# Patient Record
Sex: FEMALE | Race: WHITE | NOT HISPANIC OR LATINO | Employment: OTHER | URBAN - METROPOLITAN AREA
[De-identification: names, ages, dates, MRNs, and addresses within clinical notes are randomized per-mention and may not be internally consistent; named-entity substitution may affect disease eponyms.]

---

## 2019-05-16 ENCOUNTER — OFFICE VISIT (OUTPATIENT)
Dept: FAMILY MEDICINE | Facility: CLINIC | Age: 59
End: 2019-05-16
Payer: COMMERCIAL

## 2019-05-16 VITALS
DIASTOLIC BLOOD PRESSURE: 72 MMHG | OXYGEN SATURATION: 98 % | TEMPERATURE: 98 F | WEIGHT: 215 LBS | SYSTOLIC BLOOD PRESSURE: 138 MMHG | HEIGHT: 66 IN | HEART RATE: 98 BPM | BODY MASS INDEX: 34.55 KG/M2

## 2019-05-16 DIAGNOSIS — F90.2 ATTENTION DEFICIT HYPERACTIVITY DISORDER (ADHD), COMBINED TYPE: Primary | ICD-10-CM

## 2019-05-16 PROCEDURE — 99213 PR OFFICE/OUTPT VISIT, EST, LEVL III, 20-29 MIN: ICD-10-PCS | Mod: S$GLB,,, | Performed by: FAMILY MEDICINE

## 2019-05-16 PROCEDURE — 3008F PR BODY MASS INDEX (BMI) DOCUMENTED: ICD-10-PCS | Mod: CPTII,S$GLB,, | Performed by: FAMILY MEDICINE

## 2019-05-16 PROCEDURE — 3008F BODY MASS INDEX DOCD: CPT | Mod: CPTII,S$GLB,, | Performed by: FAMILY MEDICINE

## 2019-05-16 PROCEDURE — 99213 OFFICE O/P EST LOW 20 MIN: CPT | Mod: S$GLB,,, | Performed by: FAMILY MEDICINE

## 2019-05-16 RX ORDER — DEXTROAMPHETAMINE SACCHARATE, AMPHETAMINE ASPARTATE, DEXTROAMPHETAMINE SULFATE AND AMPHETAMINE SULFATE 5; 5; 5; 5 MG/1; MG/1; MG/1; MG/1
1 TABLET ORAL 2 TIMES DAILY
Qty: 60 TABLET | Refills: 0 | Status: SHIPPED | OUTPATIENT
Start: 2019-06-16 | End: 2019-09-09 | Stop reason: SDUPTHER

## 2019-05-16 RX ORDER — DEXTROAMPHETAMINE SACCHARATE, AMPHETAMINE ASPARTATE, DEXTROAMPHETAMINE SULFATE AND AMPHETAMINE SULFATE 2.5; 2.5; 2.5; 2.5 MG/1; MG/1; MG/1; MG/1
TABLET ORAL
COMMUNITY
End: 2019-05-16

## 2019-05-16 RX ORDER — DEXTROAMPHETAMINE SACCHARATE, AMPHETAMINE ASPARTATE, DEXTROAMPHETAMINE SULFATE AND AMPHETAMINE SULFATE 5; 5; 5; 5 MG/1; MG/1; MG/1; MG/1
1 TABLET ORAL 2 TIMES DAILY
Qty: 60 TABLET | Refills: 0 | Status: SHIPPED | OUTPATIENT
Start: 2019-07-16 | End: 2020-12-01

## 2019-05-16 RX ORDER — ACETAMINOPHEN 500 MG
1 TABLET ORAL
COMMUNITY
End: 2020-12-01

## 2019-05-16 RX ORDER — DEXTROAMPHETAMINE SACCHARATE, AMPHETAMINE ASPARTATE, DEXTROAMPHETAMINE SULFATE AND AMPHETAMINE SULFATE 5; 5; 5; 5 MG/1; MG/1; MG/1; MG/1
1 TABLET ORAL 2 TIMES DAILY
Qty: 60 TABLET | Refills: 0 | Status: SHIPPED | OUTPATIENT
Start: 2019-05-16 | End: 2019-09-09 | Stop reason: SDUPTHER

## 2019-05-16 NOTE — PROGRESS NOTES
Subjective:       Patient ID: Apolonia Soto is a 58 y.o. female.    Chief Complaint: Medication Refill (Pt stated that needed a refill for her Adderall)    57 (real date is different from chart, pt was born 1961) F here for f/u on ADHD.   Pt is on 20 mg Adderall BiD. Pt tolerates meds well, can function well and can multitask. No problems or side effects.   Pt has no other problems or concerns at this time    Review of Systems   Constitutional: Negative for activity change, chills, fatigue, fever and unexpected weight change.   HENT: Negative for ear pain, rhinorrhea and trouble swallowing.    Eyes: Negative for pain.   Respiratory: Negative for cough, chest tightness, shortness of breath and wheezing.    Cardiovascular: Negative for chest pain and palpitations.   Gastrointestinal: Negative for abdominal distention, abdominal pain, constipation, diarrhea, nausea and vomiting.   Endocrine: Negative for cold intolerance and heat intolerance.   Genitourinary: Negative for dysuria, frequency and urgency.   Musculoskeletal: Negative for myalgias.   Skin: Negative for rash.   Neurological: Negative for dizziness, syncope, light-headedness and headaches.   Hematological: Does not bruise/bleed easily.   Psychiatric/Behavioral: Negative for agitation and confusion.       Objective:      Physical Exam   Constitutional: She appears well-developed.   HENT:   Right Ear: External ear normal.   Left Ear: External ear normal.   Mouth/Throat: Oropharynx is clear and moist.   Eyes: Conjunctivae and EOM are normal.   Neck: Normal range of motion.   Cardiovascular: Normal rate, regular rhythm and intact distal pulses.   Pulmonary/Chest: Effort normal and breath sounds normal.   Abdominal: Soft.   Skin: Skin is warm. Capillary refill takes less than 2 seconds.   Psychiatric: She has a normal mood and affect.       Assessment:       1. Attention deficit hyperactivity disorder (ADHD), combined type        Plan:       PROBLEM LIST      Apolonia was seen today for medication refill.    Diagnoses and all orders for this visit:    Attention deficit hyperactivity disorder (ADHD), combined type  -     dextroamphetamine-amphetamine (ADDERALL) 20 mg tablet; Take 1 tablet by mouth 2 (two) times daily.  -     dextroamphetamine-amphetamine (ADDERALL) 20 mg tablet; Take 1 tablet by mouth 2 (two) times daily.  -     dextroamphetamine-amphetamine (ADDERALL) 20 mg tablet; Take 1 tablet by mouth 2 (two) times daily.

## 2019-09-09 ENCOUNTER — OFFICE VISIT (OUTPATIENT)
Dept: FAMILY MEDICINE | Facility: CLINIC | Age: 59
End: 2019-09-09
Payer: COMMERCIAL

## 2019-09-09 VITALS
BODY MASS INDEX: 32.3 KG/M2 | HEART RATE: 87 BPM | HEIGHT: 66 IN | SYSTOLIC BLOOD PRESSURE: 132 MMHG | RESPIRATION RATE: 16 BRPM | WEIGHT: 201 LBS | OXYGEN SATURATION: 96 % | DIASTOLIC BLOOD PRESSURE: 76 MMHG | TEMPERATURE: 97 F

## 2019-09-09 DIAGNOSIS — Z00.00 WELLNESS EXAMINATION: Primary | ICD-10-CM

## 2019-09-09 DIAGNOSIS — F90.2 ATTENTION DEFICIT HYPERACTIVITY DISORDER (ADHD), COMBINED TYPE: ICD-10-CM

## 2019-09-09 DIAGNOSIS — Z11.59 SCREENING FOR VIRAL DISEASE: ICD-10-CM

## 2019-09-09 DIAGNOSIS — R19.7 DIARRHEA, UNSPECIFIED TYPE: ICD-10-CM

## 2019-09-09 LAB
ABS NRBC COUNT: 0 X 10 3/UL (ref 0–0.01)
ABSOLUTE BASOPHIL: 0.04 X 10 3/UL (ref 0–0.22)
ABSOLUTE EOSINOPHIL: 0.06 X 10 3/UL (ref 0.04–0.54)
ABSOLUTE IMMATURE GRAN: 0.04 X 10 3/UL (ref 0–0.04)
ABSOLUTE LYMPHOCYTE: 1.99 X 10 3/UL (ref 0.86–4.75)
ABSOLUTE MONOCYTE: 1.01 X 10 3/UL (ref 0.22–1.08)
ALBUMIN SERPL-MCNC: 3.4 G/DL (ref 3.5–5.2)
ALBUMIN/GLOB SERPL ELPH: 1.1 {RATIO} (ref 1–2.7)
ALP ISOS SERPL LEV INH-CCNC: 71 IU/L (ref 35–105)
ALT (SGPT): 20 U/L (ref 0–33)
ANION GAP SERPL CALC-SCNC: 10 MMOL/L (ref 8–17)
AST SERPL-CCNC: 24 U/L (ref 0–32)
B12: 3416 PG/ML (ref 232–1245)
BASOPHILS NFR BLD: 0.4 %
BILIRUBIN, TOTAL: 0.49 MG/DL (ref 0–1.2)
BUN/CREAT SERPL: 7.5 (ref 6–20)
CALCIUM SERPL-MCNC: 9 MG/DL (ref 8.6–10.2)
CARBON DIOXIDE, CO2: 31 MMOL/L (ref 22–29)
CHLORIDE: 97 MMOL/L (ref 98–107)
CHOLEST SERPL-MSCNC: 153 MG/DL (ref 100–200)
CREAT SERPL-MCNC: 0.71 MG/DL (ref 0.5–0.9)
EOSINOPHIL NFR BLD: 0.5 %
ESTIMATED AVERAGE GLUCOSE: 118 MG/DL
GFR ESTIMATION: 84.55
GLOBULIN: 3 G/DL (ref 1.5–4.5)
GLUCOSE: 115 MG/DL (ref 74–106)
HBA1C MFR BLD: 5.8 % (ref 4–6)
HCT VFR BLD AUTO: 38.3 % (ref 37–47)
HCV IGG SERPL QL IA: NONREACTIVE
HDLC SERPL-MCNC: 37 MG/DL
HGB BLD-MCNC: 12.5 G/DL (ref 12–16)
IMMATURE GRANULOCYTES: 0.4 % (ref 0–0.5)
LDL/HDL RATIO: 2.5 (ref 1–3)
LDLC SERPL CALC-MCNC: 93.6 MG/DL (ref 0–100)
LYMPHOCYTES NFR BLD: 18.2 %
MCH RBC QN AUTO: 29.6 PG (ref 27–32)
MCHC RBC AUTO-ENTMCNC: 32.6 G/DL (ref 32–36)
MCV RBC AUTO: 90.5 FL (ref 82–100)
MONOCYTES NFR BLD: 9.2 %
NEUTROPHILS ABSOLUTE COUNT: 7.81 X 10 3/UL (ref 2.15–7.56)
NEUTROPHILS NFR BLD: 71.3 %
NUCLEATED RED BLOOD CELLS: 0 /100 WBC (ref 0–0.2)
PLATELET # BLD AUTO: 477 X 10 3/UL (ref 135–400)
POTASSIUM: 4 MMOL/L (ref 3.5–5.1)
PROT SNV-MCNC: 6.4 G/DL (ref 6.4–8.3)
RBC # BLD AUTO: 4.23 X 10 6/UL (ref 4.2–5.4)
RDW-SD: 45.9 FL (ref 37–54)
SODIUM: 138 MMOL/L (ref 136–145)
TRIGL SERPL-MCNC: 112 MG/DL (ref 0–150)
TSH SERPL DL<=0.005 MIU/L-ACNC: 2.36 UIU/ML (ref 0.27–4.2)
UREA NITROGEN (BUN): 5.3 MG/DL (ref 6–20)
VITAMIN D (25OHD): 44.6 NG/ML
WBC # BLD: 10.95 X 10 3/UL (ref 4.3–10.8)

## 2019-09-09 PROCEDURE — 3008F PR BODY MASS INDEX (BMI) DOCUMENTED: ICD-10-PCS | Mod: CPTII,S$GLB,, | Performed by: FAMILY MEDICINE

## 2019-09-09 PROCEDURE — 3008F BODY MASS INDEX DOCD: CPT | Mod: CPTII,S$GLB,, | Performed by: FAMILY MEDICINE

## 2019-09-09 PROCEDURE — 99213 OFFICE O/P EST LOW 20 MIN: CPT | Mod: S$GLB,,, | Performed by: FAMILY MEDICINE

## 2019-09-09 PROCEDURE — 99213 PR OFFICE/OUTPT VISIT, EST, LEVL III, 20-29 MIN: ICD-10-PCS | Mod: S$GLB,,, | Performed by: FAMILY MEDICINE

## 2019-09-09 RX ORDER — DEXTROAMPHETAMINE SACCHARATE, AMPHETAMINE ASPARTATE, DEXTROAMPHETAMINE SULFATE AND AMPHETAMINE SULFATE 5; 5; 5; 5 MG/1; MG/1; MG/1; MG/1
1 TABLET ORAL 2 TIMES DAILY
Qty: 60 TABLET | Refills: 0 | Status: SHIPPED | OUTPATIENT
Start: 2019-09-09 | End: 2020-12-01 | Stop reason: SDUPTHER

## 2019-09-09 RX ORDER — DEXTROAMPHETAMINE SACCHARATE, AMPHETAMINE ASPARTATE, DEXTROAMPHETAMINE SULFATE AND AMPHETAMINE SULFATE 5; 5; 5; 5 MG/1; MG/1; MG/1; MG/1
1 TABLET ORAL 2 TIMES DAILY
Qty: 60 TABLET | Refills: 0 | Status: SHIPPED | OUTPATIENT
Start: 2019-10-09 | End: 2019-12-16 | Stop reason: SDUPTHER

## 2019-09-09 RX ORDER — METRONIDAZOLE 250 MG/1
500 TABLET ORAL EVERY 12 HOURS
Qty: 14 TABLET | Refills: 0 | Status: SHIPPED | OUTPATIENT
Start: 2019-09-09 | End: 2019-09-12 | Stop reason: SDUPTHER

## 2019-09-09 RX ORDER — DEXTROAMPHETAMINE SACCHARATE, AMPHETAMINE ASPARTATE, DEXTROAMPHETAMINE SULFATE AND AMPHETAMINE SULFATE 5; 5; 5; 5 MG/1; MG/1; MG/1; MG/1
1 TABLET ORAL 2 TIMES DAILY
Qty: 60 TABLET | Refills: 0 | Status: SHIPPED | OUTPATIENT
Start: 2019-11-09 | End: 2019-12-16 | Stop reason: SDUPTHER

## 2019-09-09 NOTE — PROGRESS NOTES
Subjective:       Patient ID: Apolonia Soto is a 59 y.o. female.    Chief Complaint: No chief complaint on file.    57 yo F here for her annual wellness exam and for f/u on diarrhea x 12 days and for ADHD.   Wellness: pt will get pap smear next month, she had a mammogram last year, pt will think of getting a colonoscopy done in the near future.   Pt has had off/on abdominal pain and very smelly diarrhea x 12 days. She also had blood mixed in for one day. pt had fever for a couple of days for which she took tylenol. Zantac actually helped with the cramping. Pt gets heart burn sensation with anything she eats or drinks. Prior to the diarrhea she had a 10 day run of clindamycin antibiotics for a pending tooth procedure. She finished that regiment about 1 day prior to getting diarrhea. Cramping is mid abdominally. Cramping is just before she needs to use the restroom. Diarrhea is having mucus as well as brown color. It has a very potent smell. Pt does not remember eating potato salad, chicken salad or a warmed rice dish prior to it. There are also no sick contacts around.   Pt also has ADHD which is controlled on adderall 20 mg BiD. Pt needs ADHD medication to function well, finish work on time and to multi task.  Pt has not trouble sleeping and eating. pt denies diaphoresis, palpitations and diarrhea. pt understands the nature of the controlled substances. Pt won't share or otherwise make available the medication.     Review of Systems   Constitutional: Positive for chills and fever. Negative for activity change, fatigue and unexpected weight change.   HENT: Negative for ear pain, rhinorrhea and trouble swallowing.    Eyes: Negative for pain.   Respiratory: Negative for cough, chest tightness, shortness of breath and wheezing.    Cardiovascular: Negative for chest pain and palpitations.   Gastrointestinal: Negative for abdominal distention, abdominal pain, constipation, diarrhea, nausea and vomiting.   Endocrine:  Negative for cold intolerance and heat intolerance.   Genitourinary: Negative for dysuria, frequency and urgency.   Musculoskeletal: Positive for arthralgias. Negative for myalgias.   Skin: Negative for rash.   Neurological: Negative for dizziness, syncope, light-headedness and headaches.   Hematological: Does not bruise/bleed easily.   Psychiatric/Behavioral: Negative for agitation and confusion.       Objective:      Physical Exam   Constitutional: She appears well-developed.   HENT:   Right Ear: External ear normal.   Left Ear: External ear normal.   Mouth/Throat: Oropharynx is clear and moist.   Eyes: Conjunctivae and EOM are normal.   Neck: Normal range of motion.   Cardiovascular: Normal rate, regular rhythm and intact distal pulses.   Pulmonary/Chest: Effort normal and breath sounds normal.   Abdominal: Soft.   Skin: Skin is warm. Capillary refill takes less than 2 seconds.   Psychiatric: She has a normal mood and affect.       Assessment:       1. Wellness examination    2. Diarrhea, unspecified type    3. Attention deficit hyperactivity disorder (ADHD), combined type    4. Screening for viral disease        Plan:       PROBLEM LIST     Diagnoses and all orders for this visit:    Wellness examination  -     CBC auto differential; Future  -     Comprehensive metabolic panel; Future  -     Lipid panel; Future  -     Vitamin D; Future  -     Hemoglobin A1c; Future  -     TSH; Future  -     Vitamin B12; Future  -     Hepatitis C antibody; Future  -     CBC auto differential  -     Comprehensive metabolic panel  -     Lipid panel  -     Vitamin D  -     Hemoglobin A1c  -     TSH  -     Vitamin B12  -     Hepatitis C antibody    Diarrhea, unspecified type  -     Stool culture; Future  -     metroNIDAZOLE (FLAGYL) 250 MG tablet; Take 2 tablets (500 mg total) by mouth every 12 (twelve) hours.  -     Stool culture  -     Clostridium difficile EIA; Future  -     Clostridium difficile EIA    Attention deficit  hyperactivity disorder (ADHD), combined type  -     dextroamphetamine-amphetamine (ADDERALL) 20 mg tablet; Take 1 tablet by mouth 2 (two) times daily.  -     dextroamphetamine-amphetamine (ADDERALL) 20 mg tablet; Take 1 tablet by mouth 2 (two) times daily.  -     dextroamphetamine-amphetamine (ADDERALL) 20 mg tablet; Take 1 tablet by mouth 2 (two) times daily.    Screening for viral disease  -     Hepatitis C antibody; Future  -     Hepatitis C antibody

## 2019-09-10 LAB
APPEARANCE SNV: ABNORMAL
C. DIFFICILE TOXIN: DETECTED
CAMPYLOBACTER SPP DNA: NOT DETECTED
ETEC DNA: NOT DETECTED
P. SHIGELLOIDES DNA: NOT DETECTED
SALMONELLA SPP DNA: NOT DETECTED
SHIGA TOXIN GENE: NOT DETECTED
SHIGELLA SPP/EIEC DNA: NOT DETECTED
VIBRIO SPP DNA: NOT DETECTED
Y. ENTEROCOLITICA DNA: NOT DETECTED

## 2019-09-12 DIAGNOSIS — R19.7 DIARRHEA, UNSPECIFIED TYPE: ICD-10-CM

## 2019-09-12 RX ORDER — METRONIDAZOLE 250 MG/1
500 TABLET ORAL EVERY 12 HOURS
Qty: 14 TABLET | Refills: 0 | Status: SHIPPED | OUTPATIENT
Start: 2019-09-12 | End: 2019-09-23 | Stop reason: SDUPTHER

## 2019-09-23 ENCOUNTER — OFFICE VISIT (OUTPATIENT)
Dept: FAMILY MEDICINE | Facility: CLINIC | Age: 59
End: 2019-09-23
Payer: COMMERCIAL

## 2019-09-23 VITALS
HEART RATE: 74 BPM | SYSTOLIC BLOOD PRESSURE: 140 MMHG | OXYGEN SATURATION: 97 % | HEIGHT: 66 IN | BODY MASS INDEX: 31.5 KG/M2 | TEMPERATURE: 97 F | WEIGHT: 196 LBS | DIASTOLIC BLOOD PRESSURE: 78 MMHG | RESPIRATION RATE: 16 BRPM

## 2019-09-23 DIAGNOSIS — R19.7 DIARRHEA, UNSPECIFIED TYPE: ICD-10-CM

## 2019-09-23 DIAGNOSIS — A04.72 CLOSTRIDIUM DIFFICILE DIARRHEA: ICD-10-CM

## 2019-09-23 DIAGNOSIS — A09 DIARRHEA OF INFECTIOUS ORIGIN: Primary | ICD-10-CM

## 2019-09-23 DIAGNOSIS — R11.0 NAUSEA: ICD-10-CM

## 2019-09-23 DIAGNOSIS — A04.72 C. DIFFICILE DIARRHEA: Primary | ICD-10-CM

## 2019-09-23 PROCEDURE — 3008F PR BODY MASS INDEX (BMI) DOCUMENTED: ICD-10-PCS | Mod: CPTII,S$GLB,, | Performed by: FAMILY MEDICINE

## 2019-09-23 PROCEDURE — 99213 OFFICE O/P EST LOW 20 MIN: CPT | Mod: S$GLB,,, | Performed by: FAMILY MEDICINE

## 2019-09-23 PROCEDURE — 3008F BODY MASS INDEX DOCD: CPT | Mod: CPTII,S$GLB,, | Performed by: FAMILY MEDICINE

## 2019-09-23 PROCEDURE — 99213 PR OFFICE/OUTPT VISIT, EST, LEVL III, 20-29 MIN: ICD-10-PCS | Mod: S$GLB,,, | Performed by: FAMILY MEDICINE

## 2019-09-23 RX ORDER — ONDANSETRON 8 MG/1
8 TABLET, ORALLY DISINTEGRATING ORAL EVERY 12 HOURS PRN
Qty: 30 TABLET | Refills: 0 | Status: SHIPPED | OUTPATIENT
Start: 2019-09-23 | End: 2019-12-16

## 2019-09-23 RX ORDER — METRONIDAZOLE 250 MG/1
250 TABLET ORAL EVERY 12 HOURS
Qty: 20 TABLET | Refills: 0 | Status: SHIPPED | OUTPATIENT
Start: 2019-09-23 | End: 2019-09-24

## 2019-09-23 NOTE — PROGRESS NOTES
Subjective:       Patient ID: Apolonia Soto is a 59 y.o. female.    Chief Complaint: No chief complaint on file.    59 yo F here for repeated diarrhea which was dx'd 2 weeks ago as C-diff by stool testing. Pt was put on 1 week of metronidazole after which pt felt better briefly. Now the diarrhea has returned, as well as all the other symptoms such as swelling, cramping, mucous stool, tenesmus etc. Pt also had home tested fever at 102 two days ago.   Discussed that as per guidelines we need to treat one more bout with metronidazole. This time for 10 days.   Pt feels weak and listless.     Review of Systems   Constitutional: Positive for fatigue and fever. Negative for activity change, chills and unexpected weight change.   HENT: Negative for ear pain, rhinorrhea and trouble swallowing.    Eyes: Negative for pain.   Respiratory: Negative for cough, chest tightness, shortness of breath and wheezing.    Cardiovascular: Negative for chest pain and palpitations.   Gastrointestinal: Positive for abdominal pain, diarrhea and nausea. Negative for abdominal distention, constipation and vomiting.   Endocrine: Negative for cold intolerance and heat intolerance.   Genitourinary: Negative for dysuria, frequency and urgency.   Musculoskeletal: Negative for myalgias.   Skin: Negative for rash.   Neurological: Negative for dizziness, syncope, light-headedness and headaches.   Hematological: Does not bruise/bleed easily.   Psychiatric/Behavioral: Negative for agitation and confusion.       Objective:      Physical Exam   Constitutional: She appears well-developed.   HENT:   Right Ear: External ear normal.   Left Ear: External ear normal.   Mouth/Throat: Oropharynx is clear and moist.   Eyes: Conjunctivae and EOM are normal.   Neck: Normal range of motion.   Cardiovascular: Normal rate, regular rhythm and intact distal pulses.   Pulmonary/Chest: Effort normal and breath sounds normal.   Abdominal: Soft.   Skin: Skin is warm.  Capillary refill takes less than 2 seconds.   Psychiatric: She has a normal mood and affect.       Assessment:       1. Diarrhea of infectious origin    2. Clostridium difficile diarrhea        Plan:       PROBLEM LIST     Diagnoses and all orders for this visit:    Diarrhea of infectious origin    Clostridium difficile diarrhea    we have called out metronidazole and a stool culture for Cdiff

## 2019-09-24 DIAGNOSIS — A04.72 CLOSTRIDIUM DIFFICILE DIARRHEA: Primary | ICD-10-CM

## 2019-09-24 LAB
APPEARANCE SNV: ABNORMAL
C. DIFFICILE TOXIN: DETECTED

## 2019-09-24 RX ORDER — METRONIDAZOLE 500 MG/1
500 TABLET ORAL EVERY 8 HOURS
Qty: 24 TABLET | Refills: 0 | Status: SHIPPED | OUTPATIENT
Start: 2019-09-24 | End: 2019-10-02

## 2019-10-18 ENCOUNTER — OFFICE VISIT (OUTPATIENT)
Dept: FAMILY MEDICINE | Facility: CLINIC | Age: 59
End: 2019-10-18
Payer: COMMERCIAL

## 2019-10-18 VITALS
OXYGEN SATURATION: 96 % | HEART RATE: 85 BPM | BODY MASS INDEX: 31.82 KG/M2 | TEMPERATURE: 97 F | RESPIRATION RATE: 14 BRPM | WEIGHT: 198 LBS | HEIGHT: 66 IN

## 2019-10-18 VITALS
DIASTOLIC BLOOD PRESSURE: 78 MMHG | WEIGHT: 196 LBS | BODY MASS INDEX: 31.5 KG/M2 | HEART RATE: 75 BPM | HEIGHT: 66 IN | OXYGEN SATURATION: 96 % | SYSTOLIC BLOOD PRESSURE: 120 MMHG | RESPIRATION RATE: 16 BRPM

## 2019-10-18 DIAGNOSIS — A04.72 CLOSTRIDIUM DIFFICILE DIARRHEA: Primary | ICD-10-CM

## 2019-10-18 DIAGNOSIS — B96.20 E-COLI UTI: ICD-10-CM

## 2019-10-18 DIAGNOSIS — N30.90 KLEBSIELLA CYSTITIS: ICD-10-CM

## 2019-10-18 DIAGNOSIS — Z71.2 ENCOUNTER TO DISCUSS TEST RESULTS: ICD-10-CM

## 2019-10-18 DIAGNOSIS — N39.0 E-COLI UTI: ICD-10-CM

## 2019-10-18 DIAGNOSIS — Z09 HOSPITAL DISCHARGE FOLLOW-UP: ICD-10-CM

## 2019-10-18 DIAGNOSIS — B96.1 KLEBSIELLA CYSTITIS: ICD-10-CM

## 2019-10-18 DIAGNOSIS — R74.01 TRANSAMINITIS: ICD-10-CM

## 2019-10-18 DIAGNOSIS — D50.0 ANEMIA DUE TO BLOOD LOSS: ICD-10-CM

## 2019-10-18 DIAGNOSIS — A04.72 CLOSTRIDIOIDES DIFFICILE DIARRHEA: Primary | ICD-10-CM

## 2019-10-18 PROCEDURE — 99213 OFFICE O/P EST LOW 20 MIN: CPT | Mod: S$GLB,,, | Performed by: FAMILY MEDICINE

## 2019-10-18 PROCEDURE — 96372 PR INJECTION,THERAP/PROPH/DIAG2ST, IM OR SUBCUT: ICD-10-PCS | Mod: S$GLB,,, | Performed by: NURSE PRACTITIONER

## 2019-10-18 PROCEDURE — 3008F PR BODY MASS INDEX (BMI) DOCUMENTED: ICD-10-PCS | Mod: CPTII,S$GLB,, | Performed by: FAMILY MEDICINE

## 2019-10-18 PROCEDURE — 96372 THER/PROPH/DIAG INJ SC/IM: CPT | Mod: S$GLB,,, | Performed by: NURSE PRACTITIONER

## 2019-10-18 PROCEDURE — 99215 OFFICE O/P EST HI 40 MIN: CPT | Mod: 25,S$GLB,, | Performed by: NURSE PRACTITIONER

## 2019-10-18 PROCEDURE — 3008F PR BODY MASS INDEX (BMI) DOCUMENTED: ICD-10-PCS | Mod: CPTII,S$GLB,, | Performed by: NURSE PRACTITIONER

## 2019-10-18 PROCEDURE — 99213 PR OFFICE/OUTPT VISIT, EST, LEVL III, 20-29 MIN: ICD-10-PCS | Mod: S$GLB,,, | Performed by: FAMILY MEDICINE

## 2019-10-18 PROCEDURE — 3008F BODY MASS INDEX DOCD: CPT | Mod: CPTII,S$GLB,, | Performed by: NURSE PRACTITIONER

## 2019-10-18 PROCEDURE — 99215 PR OFFICE/OUTPT VISIT, EST, LEVL V, 40-54 MIN: ICD-10-PCS | Mod: 25,S$GLB,, | Performed by: NURSE PRACTITIONER

## 2019-10-18 PROCEDURE — 3008F BODY MASS INDEX DOCD: CPT | Mod: CPTII,S$GLB,, | Performed by: FAMILY MEDICINE

## 2019-10-18 RX ORDER — CEFTRIAXONE 1 G/1
1 INJECTION, POWDER, FOR SOLUTION INTRAMUSCULAR; INTRAVENOUS
Status: COMPLETED | OUTPATIENT
Start: 2019-10-18 | End: 2019-10-18

## 2019-10-18 RX ORDER — VANCOMYCIN HYDROCHLORIDE 250 MG/1
250 CAPSULE ORAL EVERY 6 HOURS
Qty: 12 CAPSULE | Refills: 0 | Status: SHIPPED | OUTPATIENT
Start: 2019-10-18 | End: 2019-10-21

## 2019-10-18 RX ADMIN — CEFTRIAXONE 1 G: 1 INJECTION, POWDER, FOR SOLUTION INTRAMUSCULAR; INTRAVENOUS at 12:10

## 2019-10-18 NOTE — ASSESSMENT & PLAN NOTE
Case discussed w/ Dr. Lyle. See below:    Patient will continue vancomycin 250 mg p.o. Q i.d. times 14 days after today 2/t the IM Rocephin injection she received for her gram negative UTI (see Cultures).  She was given handwritten RX to take to pharmacy for 3 additional days given she has 12 days of medication left. She will follow up with us in 14 days.  Next option will be fecal transplant, which will be done in [place]. Pt and  voiced understanding.  Dr. Lyle will handle this if needed. She was instructed to get OTC probiotic in addition to her activia.     She has Zofran, hydrocodone, and Imodium that was prescribed by [facility].  She was instructed to avoid all NSAIDs.     She will get baseline labs today @ [facility]. Will call her with results on Monday.     Information provided to the patient and .       Wash your hands with soap and water every time you use the bathroom and always before you eat. Remind relatives and friends taking care of you to do the same.  Try to use a separate bathroom if you have diarrhea. If you cant, be sure the bathroom is well cleaned before others use it.  Take showers and wash with soap to remove any C. diff germs you could be carrying on your body.    Mix 1 part bleach to 10 parts water (for example, 1/4 cup bleach poured into 2 1/2 cups water).  Surfaces  When youre cleaning, focus on items that are touched by hands:  · doorknobs  · electronics (be careful because bleach can damage many electronics and plastics)  · refrigerator handles  · shared cups  · toilet flushers and toilet seats    Laundry  If someone in your house has C. diff, wash items they touch before others use them. These include but are not limited to:  · bed linens  · towels  · household linens  · clothing, especially underwear  If these things have visible poop, rinse them well before washing.  Then launder in a washer and dryer, using the hottest water that is safe for those items. Use  chlorine bleach if the items can be safely washed with it.  Wash your hands with soap and water after you handle the dirty laundry.  Its OK to take clothes to a  that were worn by a patient infected with C. diff. However, dry cleaning isnt as effective as other methods at killing the spores. So this option should be used only for clothes that cant be machine-washed.

## 2019-10-18 NOTE — ASSESSMENT & PLAN NOTE
Very minimal findings on CBC.  No concern at this point given this is an expected finding.  Reassurance provided.

## 2019-10-18 NOTE — ASSESSMENT & PLAN NOTE
10-25 K colonies.     Sensitive to rocephin.  Will give her 1 G IM here. Repeat UA in 7 days to confirm resolution.

## 2019-10-18 NOTE — PROGRESS NOTES
Subjective:      Patient ID: Apolonia Soto is a 59 y.o. female.    Chief Complaint: cdiff      59-year-old female here today for initial consultation regarding reoccurent C difficile.  Referral by Dr. Deal.  The patient is accompanied by her  today.  Her primary care provider is Dr. Belen Starkey.  No prior medical history other than ADHD.     The patient states that she was treated prophylactically by her dentist with clindamycin back in August.  August 23rd was her last day of clindamycin. She was given 10 days of clindamycin prior to a dental procedure.  During the 10th day, she noticed fever, chills, headache and subsequently diarrhea.  She was seen by her primary care provider on September 9th and prescribed Flagyl 250 mg (2 capsules) b.i.d. times 7 days.  She relapsed on September 23rd and then was prescribed Flagyl 500 mg tid x 8 days on Sept 26th.  She had 3 days of remission between October 7th and October 10th.  On October 10th her symptoms returned. The patient became weak and was subsequently brought to [facility]"" on October 11th.  She was retested for C. Difficile at the time and was positive.  She was then given vancomycin 125 mg every 6 hr for 10 days and released.  She returned to [facility] on October 13th and was kept for dehydration and altered mental status.  She was released October 16th with a prescription of vancomycin 250 mg q.i.d. times 14 days.  She was referred to Dr. Reyna for anemia and inflammation of the colon.  Apparently he noted a UTI in the paperwork from [facility] and therefore referred the patient here for treatment of UTI and C. Diff.  The patient is currently on her 2nd day of vancomycin 250 mg.  She is eating Activia yogurt daily.  She is not on oral probiotics.  She is not on PPIs currently.       The patient states that Dr. Chandler noted a UTI in her paperwork from [facility].  She was told to address this at this visit.  She reports lower back pain and dysuria.   Denies fever, chills, flank pain. Denies suprapubic pain. Denies weakness, lethargy, AMS.       ROS:   Review of Systems   Constitutional: Negative.    Respiratory: Negative.    Cardiovascular: Negative.    Gastrointestinal: Positive for abdominal pain (cramping) and diarrhea. Negative for abdominal distention, anal bleeding, blood in stool, constipation, nausea, rectal pain and vomiting.   Endocrine: Negative.    Musculoskeletal: Positive for back pain (bilateral lumbar).   Neurological: Negative.    Hematological: Negative.      Objective:   Physical Exam   Constitutional: Vital signs are normal. She appears well-developed and well-nourished. She is active.  Non-toxic appearance. She does not have a sickly appearance. She does not appear ill. No distress.   HENT:   Right Ear: External ear normal.   Left Ear: External ear normal.   Mouth/Throat: Oropharynx is clear and moist.   Eyes: Conjunctivae and EOM are normal.   Neck: Normal range of motion.   Cardiovascular: Normal rate and regular rhythm.   Pulmonary/Chest: Effort normal and breath sounds normal.   Abdominal: Soft. Bowel sounds are increased. There is no CVA tenderness.   Musculoskeletal: She exhibits no edema.   Neurological: She is alert.   Skin: Skin is warm and dry. Capillary refill takes less than 2 seconds. No pallor.   Psychiatric: She has a normal mood and affect. Her speech is normal and behavior is normal. Judgment and thought content normal. Cognition and memory are normal.   Nursing note and vitals reviewed.    Assessment:     1. Clostridium difficile diarrhea    2. Klebsiella cystitis    3. E-coli UTI    4. Anemia due to blood loss      Plan:     Problem List Items Addressed This Visit        Renal/    Klebsiella cystitis    Current Assessment & Plan     10-25 K colonies.     Sensitive to rocephin.  Will give her 1 G IM here. Repeat UA in 7 days to confirm resolution.          Relevant Medications    cefTRIAXone injection 1 g (Completed)     Other Relevant Orders    Urinalysis, Reflex to Urine Culture Urine, Clean Catch    E-coli UTI    Current Assessment & Plan     10-25 K colonies.     Sensitive to rocephin.  Will give her 1 G IM here. Repeat UA in 7 days to confirm resolution.          Relevant Medications    cefTRIAXone injection 1 g (Completed)    Other Relevant Orders    Urinalysis, Reflex to Urine Culture Urine, Clean Catch       ID    Clostridium difficile diarrhea - Primary    Overview     3rd occurrence. PT was given clindamycin x 10 days for a dental procedure. This was initiating event.     Has tried two rounds of flagyl.    Finished a round of Vanc 125mg qid x 14 days.     Currently on Vanc 250mg qid x 14 days from [facility]         Current Assessment & Plan     Case discussed w/ Dr. Lyle. See below:    Patient will continue vancomycin 250 mg p.o. Q i.d. times 14 days after today 2/t the IM Rocephin injection she received for her gram negative UTI (see Cultures).  She was given handwritten RX to take to pharmacy for 3 additional days given she has 12 days of medication left. She will follow up with us in 14 days.  Next option will be fecal transplant, which will be done in [place]. Pt and  voiced understanding.  Dr. Lyle will handle this if needed. She was instructed to get OTC probiotic in addition to her activia.     She has Zofran, hydrocodone, and Imodium that was prescribed by [facility].  She was instructed to avoid all NSAIDs.     She will get baseline labs today @ [facility]. Will call her with results on Monday.     Information provided to the patient and .       Wash your hands with soap and water every time you use the bathroom and always before you eat. Remind relatives and friends taking care of you to do the same.  Try to use a separate bathroom if you have diarrhea. If you cant, be sure the bathroom is well cleaned before others use it.  Take showers and wash with soap to remove any C. diff germs you could  be carrying on your body.    Mix 1 part bleach to 10 parts water (for example, 1/4 cup bleach poured into 2 1/2 cups water).  Surfaces  When youre cleaning, focus on items that are touched by hands:  · doorknobs  · electronics (be careful because bleach can damage many electronics and plastics)  · refrigerator handles  · shared cups  · toilet flushers and toilet seats    Laundry  If someone in your house has C. diff, wash items they touch before others use them. These include but are not limited to:  · bed linens  · towels  · household linens  · clothing, especially underwear  If these things have visible poop, rinse them well before washing.  Then launder in a washer and dryer, using the hottest water that is safe for those items. Use chlorine bleach if the items can be safely washed with it.  Wash your hands with soap and water after you handle the dirty laundry.  Its OK to take clothes to a  that were worn by a patient infected with C. diff. However, dry cleaning isnt as effective as other methods at killing the spores. So this option should be used only for clothes that cant be machine-washed.               Relevant Orders    Comprehensive metabolic panel    CBC auto differential    Magnesium    Sedimentation rate    C-reactive protein       Oncology    Anemia due to blood loss    Current Assessment & Plan     Very minimal findings on CBC.  No concern at this point given this is an expected finding.  Reassurance provided.             Approximately 45 min was spent with the patient and  in the room.  Approximately 30 min spent reviewing medical records in the room.  Dr. Lyle was consulted for treatment and plan of care.     CT abdomen pelvis scan reviewed which showed colitis.  There was no evidence of perinephric stranding or obstructing nephrolithiasis.     All questions were answered to the patient and 's liking.  They voiced understanding that they will get labs done today and  that they would receive a call on Monday regarding all labs if available.  She will test her urine for cure in 7 days.

## 2019-10-18 NOTE — PATIENT INSTRUCTIONS
Clostridium Difficile Infection  Clostridium difficile (C. diff) bacteria can be very harmful. They affect the intestinal tract. They can cause symptoms ranging from mild diarrhea to severe inflammation of the large intestine (colon). C. diff infection is most common during the days and weeks after treatment with antibiotics. Anyone can become infected. But the risk is greatly increased for people in hospitals and for people living in nursing homes or long-term care facilities. This is because antibiotic use is common there. Germs also spread easily in these places.    What causes C. diff infection?  The stomach and intestines have hundreds of kinds of bacteria. Many of these bacteria actually help keep harmful bacteria like C. diff from causing problems. Small amounts of C. diff are normal in the intestine and dont cause problems. When you take an antibiotic, the normal balance of good and bad bacteria may be affected. There may be too few good bacteria and too many harmful bacteria like C diff. In hospitals and nursing homes, C. diff may be spread from an infected person to others. This can happen when staff or visitors touch infected people or objects such as bed rails, stethoscopes, or bedpans and then touch other people or surfaces.  What are the symptoms of C. diff infection?  About half of people with C. diff infection have no symptoms. Yet they can still pass the infection to others. Others do have symptoms. These include:  · Watery diarrhea, which may contain mucus  · Pain and cramping  · Fever  Some who are infected develop serious problems. Symptoms include:  · Belly (abdominal) pain  · Abdominal swelling  · Nausea and vomiting  · Little or no diarrhea  How is C. diff infection diagnosed?  To confirm the infection, a sample of stool is tested for the bacteria or the toxins made by the bacteria.  How is C. diff infection treated?  Your healthcare provider will tell you to stop taking any antibiotics you  have been prescribed, based on your healthcare needs. He or she may prescribe different medicines as needed. In certain cases, you may be given an antibiotic directed at the C. diff infection. Talk with your healthcare provider before stopping or starting any medicines.  · Fluids are often given by IV (intravenously) through a vein. This helps replace fluids lost through diarrhea.  · In rare cases you may need surgery if treatment doesnt cure severe symptoms  To lessen symptoms:  · Drink plenty of fluids to replace water lost through diarrhea. Talk with your healthcare provider or nurse about which fluids are best.  · Follow your healthcare providers instructions for when and what to eat.  · Unless your healthcare provider tells you to do so, don't take medicines for diarrhea.  · Tell your healthcare provider if symptoms return. Even after treatment, C. diff may come back.  Your doctor may give you an additional medicine if your symptoms come back or you are at risk for another C. diff infection. This medicine is called bezlotoxumab. It is not an antibiotic, but it can help keep your C. diff symptoms from returning.  What are the complications of C. diff infection?  Complications include:  · Dehydration  · Electrolyte imbalances  · Low protein in the blood  · Severe widening (dilation) of the large intestine  · A hole (perforation) in the bowel  · Low blood pressure  · Kidney failure  · Inflammation or infection all over the body  · Death  How is C. diff prevented?  Hospitals and nursing homes take these steps to help prevent C. diff infections:  · Limiting use of antibiotics. Giving antibiotics only when needed can help reduce C. diff infections.  · Handwashing. Hospital staff should wash their hands before and after treating each person. They should also wash their hands after touching any surface in someone's' room. Soap and water work better than alcohol-based hand .  · Protective clothing. Healthcare  workers should wear gloves and a gown when entering the room of someone with C. diff infection. They should remove these items before leaving and then wash their hands.  · Private rooms. People with C. diff should be in private rooms. Or they may share rooms with others who have the same infection.  · Thorough cleaning. Equipment and rooms should be cleaned and disinfected every day.  · Education. Everyone should be shown the best ways to avoid infection.  You can do the following to help prevent C. diff:  · Take antibiotics only when you really need them. Antibiotics dont help treat illnesses caused by viruses. This includes colds and the flu. Dont ask for antibiotics from your healthcare provider if he or she says they wont work.  · When you are given antibiotics, take them as directed. Dont take more or less than the dosage prescribed. Do not take them for shorter or longer than your provider tells you to, even if you feel better.  · Wash your hands carefully. Do this after using the bathroom and before eating. Use plenty of soap and warm water. Alcohol-based hand  may not work against C. diff germs.  Everyone can help prevent C. diff:  In a hospital or care facility:  · Wash your hands well before and after visiting someone who has C. diff infection. Use soap and water. Alcohol-based hand  may not work against C. diff.  · If the staff asks you to, wear gloves. Take any other steps you are asked to follow to help prevent infection.  At home:  · If instructed, wear gloves when caring for a family member with C. diff infection. Throw the gloves away after each use. Then wash your hands well.  · Wash the persons clothes, bed linen, and towels separately. Use hot water. Use both detergent and liquid bleach.  · Disinfect surfaces in the persons room. This includes the phone, light switches, and remote controls.  Practice good handwashing:  · Use warm water and plenty of soap. Rub your hands  together well.  · Clean your whole hand. Wash under nails, between fingers, and up your wrists.  · Wash for at least 15 seconds to 20 seconds.   · Rinse. Let the water run down your fingers, not up your wrists.  · Dry your hands well. Then use a paper towel to turn off the faucet and open the door.  Date Last Reviewed: 1/1/2017  © 7457-6939 [employer]. [address] [place] [address], [place] [address]. All rights reserved. This information is not intended as a substitute for professional medical care. Always follow your healthcare professional's instructions.

## 2019-10-18 NOTE — PROGRESS NOTES
Subjective:       Patient ID: Apolonia Soto is a 59 y.o. female.    Chief Complaint: No chief complaint on file.    58 (real age) yo F here for f/u on C Diff diarrhea x almost 2 months. Pt failed two metronidazole treatments (as per guidelines treated) and she now was started on oral vancomycin. Pt is on day 5 of treatment today. She was seen by infectious dz this morning and some labs were ordered.   Pt c/o weakness, swelling   Pt can hold food down now and she feels somewhat better besides the weakness.   She also was dx'd with a UTI and was given a rocephin shot today at ID.  Pt has lost 20 pounds since May.     Review of Systems   Constitutional: Positive for activity change and fatigue. Negative for chills, fever and unexpected weight change.   HENT: Negative for ear pain, rhinorrhea and trouble swallowing.    Eyes: Negative for pain.   Respiratory: Negative for cough, chest tightness, shortness of breath and wheezing.    Cardiovascular: Negative for chest pain and palpitations.   Gastrointestinal: Negative for abdominal distention, abdominal pain, constipation, diarrhea, nausea and vomiting.   Endocrine: Negative for cold intolerance and heat intolerance.   Genitourinary: Negative for dysuria, frequency and urgency.   Musculoskeletal: Negative for myalgias.   Skin: Negative for rash.   Neurological: Negative for dizziness, syncope, light-headedness and headaches.   Hematological: Does not bruise/bleed easily.   Psychiatric/Behavioral: Negative for agitation and confusion.       Objective:      Physical Exam   Constitutional: She appears well-developed.   HENT:   Right Ear: External ear normal.   Left Ear: External ear normal.   Mouth/Throat: Oropharynx is clear and moist.   Eyes: Conjunctivae and EOM are normal.   Neck: Normal range of motion.   Cardiovascular: Normal rate, regular rhythm and intact distal pulses.   Pulmonary/Chest: Effort normal and breath sounds normal.   Abdominal: Soft.   Skin: Skin  is warm. Capillary refill takes less than 2 seconds.   Psychiatric: She has a normal mood and affect.       Assessment:       1. Clostridioides difficile diarrhea    2. Hospital discharge follow-up    3. Encounter to discuss test results    4. Transaminitis        Plan:       PROBLEM LIST     Diagnoses and all orders for this visit:    Clostridioides difficile diarrhea    Hospital discharge follow-up  Comments:  C-diff x 8 weeks    Encounter to discuss test results    Transaminitis    continue medications, you will continue to get better  We will monitor the new onset transaminitis.

## 2019-10-20 LAB
ABS NRBC COUNT: 0 X 10 3/UL (ref 0–0.01)
ABSOLUTE BASOPHIL: 0.03 X 10 3/UL (ref 0–0.22)
ABSOLUTE EOSINOPHIL: 0.12 X 10 3/UL (ref 0.04–0.54)
ABSOLUTE IMMATURE GRAN: 0.05 X 10 3/UL (ref 0–0.04)
ABSOLUTE LYMPHOCYTE: 2.34 X 10 3/UL (ref 0.86–4.75)
ABSOLUTE MONOCYTE: 0.53 X 10 3/UL (ref 0.22–1.08)
ALBUMIN SERPL-MCNC: 4.1 G/DL (ref 3.5–5.2)
ALBUMIN/GLOB SERPL ELPH: 1.5 {RATIO} (ref 1–2.7)
ALP ISOS SERPL LEV INH-CCNC: 61 U/L (ref 35–105)
ALT (SGPT): 52 U/L (ref 0–33)
AMORPH URATE CRY URNS QL MICRO: NEGATIVE
ANION GAP SERPL CALC-SCNC: 11 MMOL/L (ref 8–17)
AST SERPL-CCNC: 62 U/L (ref 0–32)
BACTERIA #/AREA URNS HPF: ABNORMAL /[HPF]
BASOPHILS NFR BLD: 0.4 % (ref 0.2–1.2)
BILIRUB UR QL STRIP: NEGATIVE
BILIRUBIN, TOTAL: 0.27 MG/DL (ref 0–1.2)
BUN/CREAT SERPL: 8.6 (ref 6–20)
CALCIUM SERPL-MCNC: 9.8 MG/DL (ref 8.6–10.2)
CARBON DIOXIDE, CO2: 31 MMOL/L (ref 22–29)
CHLORIDE: 100 MMOL/L (ref 98–107)
CLARITY UR: CLEAR
COLOR UR: YELLOW
CREAT SERPL-MCNC: 0.63 MG/DL (ref 0.5–0.9)
CRP QUALITATIVE: POSITIVE MG/L
CRP QUANTITATIVE: 43.4 MG/L
EOSINOPHIL NFR BLD: 1.4 % (ref 0.7–7)
EPITHELIAL CELLS: ABNORMAL
GFR ESTIMATION: 97.06
GLOBULIN: 2.8 G/DL (ref 1.5–4.5)
GLUCOSE (UA): NEGATIVE MG/DL
GLUCOSE: 100 MG/DL (ref 74–106)
HCT VFR BLD AUTO: 36.6 % (ref 37–47)
HGB BLD-MCNC: 11.9 G/DL (ref 12–16)
IMMATURE GRANULOCYTES: 0.6 % (ref 0–0.5)
KETONES UR QL STRIP: NEGATIVE MG/DL
LEUKOCYTE ESTERASE UR QL STRIP: ABNORMAL
LYMPHOCYTES NFR BLD: 27.8 % (ref 19.3–53.1)
MAGNESIUM SERPL-MCNC: 2.2 MG/DL (ref 1.6–2.4)
MCH RBC QN AUTO: 30.1 PG (ref 27–32)
MCHC RBC AUTO-ENTMCNC: 32.5 G/DL (ref 32–36)
MCV RBC AUTO: 92.4 FL (ref 82–100)
MONOCYTES NFR BLD: 6.3 % (ref 4.7–12.5)
MUCOUS THREADS URNS QL MICRO: NEGATIVE
NEUTROPHILS ABSOLUTE COUNT: 5.36 X 10 3/UL (ref 2.15–7.56)
NEUTROPHILS NFR BLD: 63.5 %
NITRITE UR QL STRIP: NEGATIVE
NUCLEATED RED BLOOD CELLS: 0 /100 WBC (ref 0–0.2)
OCCULT BLOOD: NEGATIVE
PH, URINE: 7 (ref 5–7.5)
PLATELET # BLD AUTO: 380 X 10 3/UL (ref 135–400)
POTASSIUM: 4.5 MMOL/L (ref 3.5–5.1)
PROT SNV-MCNC: 6.9 G/DL (ref 6.4–8.3)
PROT UR QL STRIP: NEGATIVE MG/DL
RBC # BLD AUTO: 3.96 X 10 6/UL (ref 4.2–5.4)
RBC/HPF: NEGATIVE
RDW-SD: 51.4 FL (ref 37–54)
SED RATE (WESTERGREN): 44 MM/HR (ref 0–30)
SODIUM: 142 MMOL/L (ref 136–145)
SP GR UR STRIP: 1 (ref 1–1.03)
UREA NITROGEN (BUN): 5.4 MG/DL (ref 6–20)
URINE CULTURE, ROUTINE: NORMAL
UROBILINOGEN, URINE: NORMAL E.U./DL (ref 0–1)
WBC # BLD: 8.43 X 10 3/UL (ref 4.3–10.8)
WBC/HPF: ABNORMAL

## 2019-11-01 ENCOUNTER — OFFICE VISIT (OUTPATIENT)
Dept: FAMILY MEDICINE | Facility: CLINIC | Age: 59
End: 2019-11-01
Payer: COMMERCIAL

## 2019-11-01 VITALS
HEIGHT: 66 IN | RESPIRATION RATE: 16 BRPM | OXYGEN SATURATION: 99 % | BODY MASS INDEX: 31.82 KG/M2 | SYSTOLIC BLOOD PRESSURE: 120 MMHG | HEART RATE: 78 BPM | WEIGHT: 198 LBS | DIASTOLIC BLOOD PRESSURE: 81 MMHG

## 2019-11-01 DIAGNOSIS — R74.01 TRANSAMINITIS: ICD-10-CM

## 2019-11-01 DIAGNOSIS — A04.72 CLOSTRIDIUM DIFFICILE DIARRHEA: Primary | ICD-10-CM

## 2019-11-01 DIAGNOSIS — M25.50 ARTHRALGIA, UNSPECIFIED JOINT: ICD-10-CM

## 2019-11-01 PROCEDURE — 99214 PR OFFICE/OUTPT VISIT, EST, LEVL IV, 30-39 MIN: ICD-10-PCS | Mod: S$GLB,,, | Performed by: NURSE PRACTITIONER

## 2019-11-01 PROCEDURE — 99214 OFFICE O/P EST MOD 30 MIN: CPT | Mod: S$GLB,,, | Performed by: NURSE PRACTITIONER

## 2019-11-01 PROCEDURE — 3008F BODY MASS INDEX DOCD: CPT | Mod: CPTII,S$GLB,, | Performed by: NURSE PRACTITIONER

## 2019-11-01 PROCEDURE — 3008F PR BODY MASS INDEX (BMI) DOCUMENTED: ICD-10-PCS | Mod: CPTII,S$GLB,, | Performed by: NURSE PRACTITIONER

## 2019-11-01 RX ORDER — TRAMADOL HYDROCHLORIDE 50 MG/1
50 TABLET ORAL EVERY 4 HOURS PRN
Qty: 30 TABLET | Refills: 0 | Status: SHIPPED | OUTPATIENT
Start: 2019-11-01 | End: 2019-12-16

## 2019-11-01 NOTE — PROGRESS NOTES
Subjective:      Patient ID: Apolonia Soto is a 59 y.o. female.    Chief Complaint: Follow-up (2 wk f/u - pt states she is feeling better.)      59-year-old female here today for follow up consultation regarding reoccurent C difficile.  Referral by Dr. Deal.  The patient is accompanied by her  today.  Her primary care provider is Dr. Belen Starkey.  No prior medical history other than ADHD. Here for 2 wk f/u recurrent C. Difficile. She reports finishing Vancomycin 250mg q.i.d. X 14 day course yesterday. Feels better overall.  Bowel movements are returning to normal. She states there no longer is a foul smell. She denies abd pain, fever, chills, N/V/D.  Denies side effects from medication. She is currently on probiotics daily.     Her only complaint is multiple joint pain. Pre-existing issue for her.  She is requesting an alternate pain med than tylenol d/t her LFT abnormality. She would like labs done on Monday regarding her LFTs and her UTI.  She denies dysuria, hematuria, frequency, incontinence. Denies fever, chills, flank pain. Denies suprapubic pain. Denies weakness, lethargy, AMS.             ROS:   Review of Systems   Constitutional: Positive for fatigue. Negative for activity change, appetite change, chills, diaphoresis, fever and unexpected weight change.   HENT: Negative for ear pain, rhinorrhea and trouble swallowing.    Eyes: Negative for pain.   Respiratory: Negative for cough, chest tightness, shortness of breath and wheezing.    Cardiovascular: Negative for chest pain and palpitations.   Gastrointestinal: Negative for abdominal distention, abdominal pain, constipation, diarrhea, nausea and vomiting.   Endocrine: Negative for cold intolerance and heat intolerance.   Genitourinary: Negative for dysuria, frequency and urgency.   Musculoskeletal: Positive for arthralgias. Negative for back pain and myalgias.   Skin: Negative for rash.   Neurological: Negative for dizziness, syncope,  light-headedness and headaches.   Hematological: Does not bruise/bleed easily.   Psychiatric/Behavioral: Negative for agitation and confusion.     Objective:   Physical Exam   Constitutional: She is oriented to person, place, and time. Vital signs are normal. She appears well-developed and well-nourished. She is active.  Non-toxic appearance. She does not have a sickly appearance. She does not appear ill. No distress.   Eyes: Conjunctivae and EOM are normal.   Neck: Normal range of motion.   Cardiovascular: Normal rate and regular rhythm.   Pulmonary/Chest: Effort normal and breath sounds normal.   Abdominal: Soft.   Neurological: She is alert and oriented to person, place, and time.   Skin: Skin is warm. Capillary refill takes less than 2 seconds. She is not diaphoretic.   Psychiatric: She has a normal mood and affect. Her speech is normal and behavior is normal. Judgment and thought content normal. She is not actively hallucinating. Cognition and memory are normal. She is attentive.   Nursing note and vitals reviewed.    Assessment:     1. Clostridium difficile diarrhea    2. Transaminitis    3. Arthralgia, unspecified joint      Plan:     Problem List Items Addressed This Visit        ID    Clostridium difficile diarrhea - Primary    Overview     3rd occurrence. PT was given clindamycin x 10 days for a dental procedure. This was initiating event.     Has tried two rounds of flagyl.    Finished a round of Vanc 125mg qid x 14 days.     Currently on Vanc 250mg qid x 14 days from [facility]         Current Assessment & Plan     Finished 14 day abx course yesterday. No side effects from med.  Voiced complete resolution of symptoms.       Pt and  questioned continuing the rest of the 125mg Vancomycin she had left over. Advised against this. Prolonged course of Abx would not yield different results and she would be at risk for drug resistance.     PT instructed to RTC if any of her S/s returned. At that time,  would refer her for fecal transplantation as this is her 4th recurrence. Evidence shows almost 90% cure with this.     Pt asked if she was able to go out in public.  Can return to normal activity as long as she is not having s/s of C. Diff. Continue preventative measures.     PT advised to avoid the follow Abx if possible.     Fluoroquinolones   Clindamycin              Relevant Orders    CBC auto differential    Comprehensive metabolic panel    Urinalysis, Reflex to Urine Culture Urine, Clean Catch       GI    Transaminitis    Current Assessment & Plan     PT requesting repeat CMP for LFTs.  Likely medication induced. Hepatocellular and barely elevated in prior labs.     Will call her with results. Informed pt and  this would be worked up further by her PCP.          Relevant Orders    CBC auto differential    Comprehensive metabolic panel    Urinalysis, Reflex to Urine Culture Urine, Clean Catch      Other Visit Diagnoses     Arthralgia, unspecified joint        Patient given Ultram p.r.n. pain. She will follow up with her PCP in regards to pain management.  Advised to withhold steroid injections for at least 2-3 weeks.

## 2019-11-01 NOTE — ASSESSMENT & PLAN NOTE
PT requesting repeat CMP for LFTs.  Likely medication induced. Hepatocellular and barely elevated in prior labs.     Will call her with results. Informed pt and  this would be worked up further by her PCP.

## 2019-11-01 NOTE — ASSESSMENT & PLAN NOTE
Finished 14 day abx course yesterday. No side effects from med.  Voiced complete resolution of symptoms.       Pt and  questioned continuing the rest of the 125mg Vancomycin she had left over. Advised against this. Prolonged course of Abx would not yield different results and she would be at risk for drug resistance.     PT instructed to RTC if any of her S/s returned. At that time, would refer her for fecal transplantation as this is her 4th recurrence. Evidence shows almost 90% cure with this.     Pt asked if she was able to go out in public.  Can return to normal activity as long as she is not having s/s of C. Diff. Continue preventative measures.     PT advised to avoid the follow Abx if possible.     Fluoroquinolones   Clindamycin

## 2019-11-11 ENCOUNTER — TELEPHONE (OUTPATIENT)
Dept: GASTROENTEROLOGY | Facility: CLINIC | Age: 59
End: 2019-11-11

## 2019-11-11 ENCOUNTER — OFFICE VISIT (OUTPATIENT)
Dept: FAMILY MEDICINE | Facility: CLINIC | Age: 59
End: 2019-11-11
Payer: COMMERCIAL

## 2019-11-11 VITALS
RESPIRATION RATE: 16 BRPM | BODY MASS INDEX: 31.82 KG/M2 | WEIGHT: 198 LBS | DIASTOLIC BLOOD PRESSURE: 81 MMHG | HEIGHT: 66 IN | OXYGEN SATURATION: 96 % | HEART RATE: 98 BPM | SYSTOLIC BLOOD PRESSURE: 120 MMHG | TEMPERATURE: 102 F

## 2019-11-11 DIAGNOSIS — R50.9 FEVER, UNSPECIFIED FEVER CAUSE: ICD-10-CM

## 2019-11-11 DIAGNOSIS — A04.71 RECURRENT CLOSTRIDIOIDES DIFFICILE DIARRHEA: Primary | ICD-10-CM

## 2019-11-11 DIAGNOSIS — R19.7 DIARRHEA, UNSPECIFIED TYPE: ICD-10-CM

## 2019-11-11 PROBLEM — A04.8 HELICOBACTER PYLORI INFECTION: Status: ACTIVE | Noted: 2019-11-11

## 2019-11-11 LAB — HELICOBACTER PYLORI: POSITIVE

## 2019-11-11 PROCEDURE — 3008F PR BODY MASS INDEX (BMI) DOCUMENTED: ICD-10-PCS | Mod: CPTII,S$GLB,, | Performed by: NURSE PRACTITIONER

## 2019-11-11 PROCEDURE — 99214 OFFICE O/P EST MOD 30 MIN: CPT | Mod: S$GLB,,, | Performed by: NURSE PRACTITIONER

## 2019-11-11 PROCEDURE — 3008F BODY MASS INDEX DOCD: CPT | Mod: CPTII,S$GLB,, | Performed by: NURSE PRACTITIONER

## 2019-11-11 PROCEDURE — 99214 PR OFFICE/OUTPT VISIT, EST, LEVL IV, 30-39 MIN: ICD-10-PCS | Mod: S$GLB,,, | Performed by: NURSE PRACTITIONER

## 2019-11-11 NOTE — PROGRESS NOTES
Subjective:      Patient ID: Apolonia Soto is a 59 y.o. female.    Chief Complaint: CDIFF (Possible recurrence since SAT.) and Fatigue      59-YEAR-OLD FEMALE WITH A PAST HISTORY OF RECURRENT C DIFFICILE HERE TODAY FOR ISSUES SIMILAR TO HER PRIOR INFECTIONS.  SHE REPORTS 7 DAYS OF REMISSION OF C DIFFICILE STATUS POST 14 DAYS OF VANCOMYCIN 250 MG Q.I.D. SHE IS CURRENTLY HAVING UP TO 7 STOOLS PER DAY.  THE CONSISTENCY OF THE STOOLS ARE DIFFERENT THIS GO AROUND.  HER PRIOR OCCURRENCES WERE GREENISH AND FOUL.  HER DIARRHEA TODAY IS DARK.  THERE IS NO ODOR.  SHE REPORTS ABDOMINAL CRAMPING AND LOWER BACK PAIN SIMILAR TO HER PRIOR EPISODES.  THIS IS HER 4TH OCCURRENCE.  HER LAST DOSE OF VANCOMYCIN WAS ON 10/31.  SHE DENIES DYSURIA, HEMATURIA, FLANK PAIN. DENIES NAUSEA/VOMITING.  SHE DOES REPORT DRINKING DIAZ LITE THIS WEEKEND, WHICH WAS APPROXIMATELY 7 DAYS AFTER HER LAST DOSE OF VANCOMYCIN.  SHE IS CURRENTLY TAKING ADDERALL FOR ADHD.  SHE IS UNABLE TO TAKE IMODIUM DUE TO SWELLING OF THE ABDOMEN.  NO OTHER ISSUES REPORTED THIS TIME.  THEY WOULD LIKE TO BE REFERRED TO DR. JUDITH FERNANDEZ IN [facility] IN [place] FOR POSSIBLE FMT.       ROS:   Review of Systems   Constitutional: Positive for appetite change, fatigue and fever. Negative for activity change, chills, diaphoresis and unexpected weight change.   Respiratory: Negative.    Cardiovascular: Negative.    Gastrointestinal: Positive for abdominal pain (cramping) and diarrhea. Negative for abdominal distention, anal bleeding, blood in stool, constipation, nausea, rectal pain and vomiting.   Endocrine: Negative.    Musculoskeletal: Positive for back pain (bilateral lumbar).   Neurological: Negative.    Hematological: Negative.      Objective:   Physical Exam   Constitutional: She appears well-developed and well-nourished. She is active.  Non-toxic appearance. She does not have a sickly appearance. She appears ill. No distress.       HENT:   Right Ear: External  ear normal.   Left Ear: External ear normal.   Mouth/Throat: Oropharynx is clear and moist.   Eyes: Conjunctivae and EOM are normal.   Neck: Normal range of motion.   Cardiovascular: Regular rhythm. Tachycardia present.   Pulmonary/Chest: Effort normal and breath sounds normal.   Abdominal: Soft. Bowel sounds are increased. There is no CVA tenderness.   Musculoskeletal: She exhibits no edema.   Neurological: She is alert.   Skin: Skin is warm and dry. Capillary refill takes less than 2 seconds. No pallor.   Psychiatric: She has a normal mood and affect. Her speech is normal and behavior is normal. Judgment and thought content normal. Cognition and memory are normal.   Nursing note and vitals reviewed.    Assessment:     1. Recurrent Clostridioides difficile diarrhea    2. Diarrhea, unspecified type    3. Fever, unspecified fever cause      Plan:     Problem List Items Addressed This Visit        GI    Diarrhea    Relevant Orders    Comprehensive metabolic panel    CBC auto differential    Urinalysis, Reflex to Urine Culture Urine, Clean Catch    Stool Exam-Ova,Cysts,Parasites    Stool culture    Occult blood x 1, stool    Stool Exam-Ova,Cysts,Parasites    WBC, Stool    Clostridium difficile EIA    H. PYLORI ANTIBODY, IGG      Other Visit Diagnoses     Recurrent Clostridioides difficile diarrhea    -  Primary    Will ensure no other pathogens responsible. C. Diff can be detected in stool up to 6 wks post clearance so will not draw. CBC, CMP, Stools. Call pt w/ results.     Relevant Orders    Comprehensive metabolic panel    CBC auto differential    Urinalysis, Reflex to Urine Culture Urine, Clean Catch    Stool Exam-Ova,Cysts,Parasites    Stool culture    Occult blood x 1, stool    Stool Exam-Ova,Cysts,Parasites    WBC, Stool    Clostridium difficile EIA    Ambulatory referral to Gastroenterology    Fever, unspecified fever cause        Instrcuted to take Tylenol only PRN          Will for the patient to Dr. Canales  Romeo Ruggiero as requested for FMT.  The patient's  has contacts in his office.  Instructed him to call if he has not been scheduled by Friday.  Encouraged hydration with Pedialyte and treatment of fever with Tylenol.  Return to clinic if symptoms worsen or development of abdominal distension.  CT abdomen is not warranted currently.  Will insure that no other pathogen is the cause of her diarrhea.  Will call her with the results of her labs.  Gastroenterology will be of multiple those labs within the system.     She may warrant some IV hydration.  Will determine once CMP received.     Dr. Lyle did meet with the patient and  while I was in the room.  He agrees that the best option for her would be fecal transplant.  All questions were answered to their liking prior to discharge.

## 2019-11-11 NOTE — TELEPHONE ENCOUNTER
MA spoke to Salbador,     Salbador is request if patient can possibly just have fecal transplant.   Salbador stated can Dr. Petersen please discuss further with Dr. Starkey and JOANN Church     Message routed to Dr. Petersen     ----- Message from Heber Betancur sent at 11/11/2019  8:54 AM CST -----  Contact: Salbador Soto (): [phone]  Pt's  state the pt has c-diff, and would like to schedule a appt     Please contact Salbador Soto (): [phone]

## 2019-11-12 DIAGNOSIS — A04.8 BACTERIAL INFECTION DUE TO H. PYLORI: ICD-10-CM

## 2019-11-12 DIAGNOSIS — A04.72 CLOSTRIDIUM DIFFICILE DIARRHEA: Primary | ICD-10-CM

## 2019-11-12 LAB
ABS NRBC COUNT: 0 X 10 3/UL (ref 0–0.01)
ABSOLUTE BASOPHIL: 0.03 X 10 3/UL (ref 0–0.22)
ABSOLUTE EOSINOPHIL: 0.03 X 10 3/UL (ref 0.04–0.54)
ABSOLUTE IMMATURE GRAN: 0.07 X 10 3/UL (ref 0–0.04)
ABSOLUTE LYMPHOCYTE: 2.51 X 10 3/UL (ref 0.86–4.75)
ABSOLUTE MONOCYTE: 1.31 X 10 3/UL (ref 0.22–1.08)
ALBUMIN SERPL-MCNC: 4 G/DL (ref 3.5–5.2)
ALBUMIN/GLOB SERPL ELPH: 1.3 {RATIO} (ref 1–2.7)
ALP ISOS SERPL LEV INH-CCNC: 83 U/L (ref 35–105)
ALT (SGPT): 23 U/L (ref 0–33)
ANION GAP SERPL CALC-SCNC: 12 MMOL/L (ref 8–17)
APPEARANCE SNV: NORMAL
AST SERPL-CCNC: 18 U/L (ref 0–32)
BASOPHILS NFR BLD: 0.2 % (ref 0.2–1.2)
BILIRUBIN, TOTAL: 0.6 MG/DL (ref 0–1.2)
BUN/CREAT SERPL: 8.3 (ref 6–20)
CALCIUM SERPL-MCNC: 9.3 MG/DL (ref 8.6–10.2)
CARBON DIOXIDE, CO2: 27 MMOL/L (ref 22–29)
CHLORIDE: 94 MMOL/L (ref 98–107)
COLOR UR: NORMAL
CREAT SERPL-MCNC: 0.8 MG/DL (ref 0.5–0.9)
CRYPTOSPORIDIUM DNA: NOT DETECTED
ENTAMOEBA HISTOLYTICA: NOT DETECTED
EOSINOPHIL NFR BLD: 0.2 % (ref 0.7–7)
GFR ESTIMATION: 73.67
GIARDIA LAMBLIA DNA: NOT DETECTED
GLOBULIN: 3 G/DL (ref 1.5–4.5)
GLUCOSE: 126 MG/DL (ref 74–106)
HCT VFR BLD AUTO: 38.4 % (ref 37–47)
HGB BLD-MCNC: 12.8 G/DL (ref 12–16)
IMMATURE GRANULOCYTES: 0.4 % (ref 0–0.5)
LYMPHOCYTES NFR BLD: 13.8 % (ref 19.3–53.1)
MCH RBC QN AUTO: 30.5 PG (ref 27–32)
MCHC RBC AUTO-ENTMCNC: 33.3 G/DL (ref 32–36)
MCV RBC AUTO: 91.6 FL (ref 82–100)
MONOCYTES NFR BLD: 7.2 % (ref 4.7–12.5)
NEUTROPHILS ABSOLUTE COUNT: 14.23 X 10 3/UL (ref 2.15–7.56)
NEUTROPHILS NFR BLD: 78.2 %
NUCLEATED RED BLOOD CELLS: 0 /100 WBC (ref 0–0.2)
PLATELET # BLD AUTO: 261 X 10 3/UL (ref 135–400)
POTASSIUM: 4.2 MMOL/L (ref 3.5–5.1)
PROT SNV-MCNC: 7 G/DL (ref 6.4–8.3)
RBC # BLD AUTO: 4.19 X 10 6/UL (ref 4.2–5.4)
RDW-SD: 48.8 FL (ref 37–54)
SODIUM: 133 MMOL/L (ref 136–145)
SOURCE: NORMAL
UREA NITROGEN (BUN): 6.6 MG/DL (ref 6–20)
WBC # BLD: 18.18 X 10 3/UL (ref 4.3–10.8)

## 2019-11-12 RX ORDER — CLARITHROMYCIN 250 MG/1
500 TABLET, FILM COATED ORAL EVERY 12 HOURS
Qty: 56 TABLET | Refills: 0 | Status: SHIPPED | OUTPATIENT
Start: 2019-11-12 | End: 2019-11-26

## 2019-11-12 RX ORDER — ESOMEPRAZOLE MAGNESIUM 40 MG/1
40 CAPSULE, DELAYED RELEASE ORAL 2 TIMES DAILY
Qty: 28 CAPSULE | Refills: 0 | Status: SHIPPED | OUTPATIENT
Start: 2019-11-12 | End: 2020-12-01 | Stop reason: SDUPTHER

## 2019-11-12 RX ORDER — METRONIDAZOLE 500 MG/1
500 TABLET ORAL EVERY 8 HOURS
Qty: 42 TABLET | Refills: 0 | Status: SHIPPED | OUTPATIENT
Start: 2019-11-12 | End: 2019-11-26

## 2019-11-13 ENCOUNTER — TELEPHONE (OUTPATIENT)
Dept: GASTROENTEROLOGY | Facility: CLINIC | Age: 59
End: 2019-11-13

## 2019-11-13 NOTE — TELEPHONE ENCOUNTER
"MA contact Salbador to offer video visit instead office visit per Dr. Petersen. Odscar didn't answer left detail message to return call       ----- Message from Parker Petersen MD sent at 11/13/2019  2:27 PM CST -----  Contact: Salbador/#[phone]  Can we do a video visit prior instead?  ----- Message -----  From: Александр Cooper MA  Sent: 11/13/2019   2:22 PM CST  To: MD Salbador Mahan is requesting patient just have FMT not clinic visit        MA spoke to Salbador,      Salbador is request if patient can possibly just have fecal transplant.   Salbador stated can Dr. Petersen please discuss further with Dr. Starkey and JOANN Church      Message routed to Dr. Petersen        ----- Message -----  From: Parker Petersen MD  Sent: 11/13/2019   2:20 PM CST  To: Александр Cooper MA    Yeah this one needs to be scheduled with me for possible FMT.  I got the note about her H pylori. Was there another note or question?  ----- Message -----  From: Александр Cooper MA  Sent: 11/13/2019   2:18 PM CST  To: Parker Petersen MD    I'm not why this was scheduled with Alma. I'll cancel that apt. Did you get the message I sent to you 11/11 ?   ----- Message -----  From: Isela Menchaca  Sent: 11/13/2019   1:20 PM CST  To: Nicola Canales Staff    Calling to speak with nurse regarding "C diff" and H pylori, PCP referred them to Dr Petersen. Scheduled with Alma Thomas NP due to no appts available with Dr Petersen.Patient is taking antibiotics for 14days and after completing meds, C diff returns.  Please call            "

## 2019-11-25 RX ORDER — BIMATOPROST 3 UG/ML
SOLUTION TOPICAL
Qty: 2.5 ML | Refills: 12 | Status: SHIPPED | OUTPATIENT
Start: 2019-11-25 | End: 2020-12-01 | Stop reason: SDUPTHER

## 2019-12-09 PROBLEM — Z00.00 WELLNESS EXAMINATION: Status: RESOLVED | Noted: 2019-09-09 | Resolved: 2019-12-09

## 2019-12-16 ENCOUNTER — OFFICE VISIT (OUTPATIENT)
Dept: FAMILY MEDICINE | Facility: CLINIC | Age: 59
End: 2019-12-16
Payer: COMMERCIAL

## 2019-12-16 VITALS
HEART RATE: 73 BPM | SYSTOLIC BLOOD PRESSURE: 158 MMHG | OXYGEN SATURATION: 98 % | TEMPERATURE: 97 F | RESPIRATION RATE: 14 BRPM | WEIGHT: 198 LBS | HEIGHT: 66 IN | DIASTOLIC BLOOD PRESSURE: 88 MMHG | BODY MASS INDEX: 31.82 KG/M2

## 2019-12-16 DIAGNOSIS — F90.2 ATTENTION DEFICIT HYPERACTIVITY DISORDER (ADHD), COMBINED TYPE: Primary | ICD-10-CM

## 2019-12-16 DIAGNOSIS — I10 ESSENTIAL HYPERTENSION: ICD-10-CM

## 2019-12-16 PROCEDURE — 3008F PR BODY MASS INDEX (BMI) DOCUMENTED: ICD-10-PCS | Mod: CPTII,S$GLB,, | Performed by: FAMILY MEDICINE

## 2019-12-16 PROCEDURE — 99214 OFFICE O/P EST MOD 30 MIN: CPT | Mod: S$GLB,,, | Performed by: FAMILY MEDICINE

## 2019-12-16 PROCEDURE — 3008F BODY MASS INDEX DOCD: CPT | Mod: CPTII,S$GLB,, | Performed by: FAMILY MEDICINE

## 2019-12-16 PROCEDURE — 99214 PR OFFICE/OUTPT VISIT, EST, LEVL IV, 30-39 MIN: ICD-10-PCS | Mod: S$GLB,,, | Performed by: FAMILY MEDICINE

## 2019-12-16 RX ORDER — DEXTROAMPHETAMINE SACCHARATE, AMPHETAMINE ASPARTATE, DEXTROAMPHETAMINE SULFATE AND AMPHETAMINE SULFATE 5; 5; 5; 5 MG/1; MG/1; MG/1; MG/1
1 TABLET ORAL 2 TIMES DAILY
Qty: 60 TABLET | Refills: 0 | Status: SHIPPED | OUTPATIENT
Start: 2020-01-16 | End: 2020-12-01 | Stop reason: SDUPTHER

## 2019-12-16 RX ORDER — DEXTROAMPHETAMINE SACCHARATE, AMPHETAMINE ASPARTATE, DEXTROAMPHETAMINE SULFATE AND AMPHETAMINE SULFATE 5; 5; 5; 5 MG/1; MG/1; MG/1; MG/1
1 TABLET ORAL 2 TIMES DAILY
Qty: 60 TABLET | Refills: 0 | Status: SHIPPED | OUTPATIENT
Start: 2019-12-16 | End: 2020-12-01

## 2019-12-16 RX ORDER — DEXTROAMPHETAMINE SACCHARATE, AMPHETAMINE ASPARTATE, DEXTROAMPHETAMINE SULFATE AND AMPHETAMINE SULFATE 5; 5; 5; 5 MG/1; MG/1; MG/1; MG/1
1 TABLET ORAL 2 TIMES DAILY
Qty: 60 TABLET | Refills: 0 | Status: SHIPPED | OUTPATIENT
Start: 2020-02-16 | End: 2020-12-01 | Stop reason: SDUPTHER

## 2019-12-16 RX ORDER — HYDROCHLOROTHIAZIDE 25 MG/1
12.5 TABLET ORAL DAILY
Qty: 90 TABLET | Refills: 1 | Status: SHIPPED | OUTPATIENT
Start: 2019-12-16 | End: 2020-09-09 | Stop reason: SDUPTHER

## 2019-12-16 NOTE — PROGRESS NOTES
Subjective:       Patient ID: Apolonia Soto is a 59 y.o. female.    Chief Complaint: No chief complaint on file.    58 yo F here for f/u on ADHD.   Pt needs ADHD medication to function well, finish work on time and to multi task.  Pt has not trouble sleeping and eating. pt denies diaphoresis, palpitations and diarrhea. pt understands the nature of the controlled substances. Pt won't share or otherwise make available the medication.   Pt's BP is not controlled. This is a new dx and pt is willing to get on some BP medication. Discussed the different kind MoA and pt is willing to take the HCTZ.     Review of Systems   Constitutional: Negative for activity change, chills, fatigue, fever and unexpected weight change.   HENT: Negative for ear pain, rhinorrhea and trouble swallowing.    Eyes: Negative for pain.   Respiratory: Negative for cough, chest tightness, shortness of breath and wheezing.    Cardiovascular: Negative for chest pain and palpitations.   Gastrointestinal: Negative for abdominal distention, abdominal pain, constipation, diarrhea, nausea and vomiting.   Endocrine: Negative for cold intolerance and heat intolerance.   Genitourinary: Negative for dysuria, frequency and urgency.   Musculoskeletal: Negative for myalgias.   Skin: Negative for rash.   Neurological: Negative for dizziness, syncope, light-headedness and headaches.   Hematological: Does not bruise/bleed easily.   Psychiatric/Behavioral: Negative for agitation and confusion.       Objective:      Physical Exam   Constitutional: She appears well-developed.   HENT:   Right Ear: External ear normal.   Left Ear: External ear normal.   Mouth/Throat: Oropharynx is clear and moist.   Eyes: Conjunctivae and EOM are normal.   Neck: Normal range of motion.   Cardiovascular: Normal rate, regular rhythm and intact distal pulses.   Pulmonary/Chest: Effort normal and breath sounds normal.   Abdominal: Soft.   Musculoskeletal: Normal range of motion.    Neurological: She is alert.   Skin: Skin is warm. Capillary refill takes less than 2 seconds.   Psychiatric: She has a normal mood and affect.   Nursing note and vitals reviewed.      Assessment:       1. Attention deficit hyperactivity disorder (ADHD), combined type    2. Essential hypertension        Plan:       PROBLEM LIST     Diagnoses and all orders for this visit:    Attention deficit hyperactivity disorder (ADHD), combined type  -     dextroamphetamine-amphetamine (ADDERALL) 20 mg tablet; Take 1 tablet by mouth 2 (two) times daily.  -     dextroamphetamine-amphetamine (ADDERALL) 20 mg tablet; Take 1 tablet by mouth 2 (two) times daily.  -     dextroamphetamine-amphetamine (ADDERALL) 20 mg tablet; Take 1 tablet by mouth 2 (two) times daily.    Essential hypertension  Comments:  new dx, HCTZ 12.5 mg  Orders:  -     hydroCHLOROthiazide (HYDRODIURIL) 25 MG tablet; Take 0.5 tablets (12.5 mg total) by mouth once daily.

## 2020-05-20 ENCOUNTER — PATIENT OUTREACH (OUTPATIENT)
Dept: ADMINISTRATIVE | Facility: HOSPITAL | Age: 60
End: 2020-05-20

## 2020-05-20 NOTE — PROGRESS NOTES
Immunizations abstracted. New immunizations added.  updated. Care Everywhere abstracted.  [facility]:imported 2013 pap smear to  [facility]: no new records found [facility]: no new records found.  Imported colonoscopy report found in search bar of chart.  *KDL*

## 2020-07-07 DIAGNOSIS — M62.838 MUSCLE SPASM: Primary | ICD-10-CM

## 2020-07-07 RX ORDER — METHYLPREDNISOLONE 4 MG/1
TABLET ORAL
Qty: 21 TABLET | Refills: 0 | Status: SHIPPED | OUTPATIENT
Start: 2020-07-07 | End: 2020-12-01 | Stop reason: SDUPTHER

## 2020-09-09 DIAGNOSIS — I10 ESSENTIAL HYPERTENSION: ICD-10-CM

## 2020-09-09 RX ORDER — HYDROCHLOROTHIAZIDE 25 MG/1
12.5 TABLET ORAL DAILY
Qty: 90 TABLET | Refills: 1 | Status: SHIPPED | OUTPATIENT
Start: 2020-09-09 | End: 2020-09-09

## 2020-09-09 RX ORDER — HYDROCHLOROTHIAZIDE 25 MG/1
12.5 TABLET ORAL DAILY
Qty: 90 TABLET | Refills: 1 | Status: SHIPPED | OUTPATIENT
Start: 2020-09-09 | End: 2020-12-01 | Stop reason: SDUPTHER

## 2020-12-01 ENCOUNTER — OFFICE VISIT (OUTPATIENT)
Dept: FAMILY MEDICINE | Facility: CLINIC | Age: 60
End: 2020-12-01
Payer: COMMERCIAL

## 2020-12-01 DIAGNOSIS — E11.9 TYPE 2 DIABETES MELLITUS WITHOUT COMPLICATION, WITHOUT LONG-TERM CURRENT USE OF INSULIN: ICD-10-CM

## 2020-12-01 DIAGNOSIS — F90.2 ATTENTION DEFICIT HYPERACTIVITY DISORDER (ADHD), COMBINED TYPE: Primary | Chronic | ICD-10-CM

## 2020-12-01 DIAGNOSIS — Z86.19 HISTORY OF HELICOBACTER PYLORI INFECTION: Chronic | ICD-10-CM

## 2020-12-01 DIAGNOSIS — M62.838 MUSCLE SPASM: ICD-10-CM

## 2020-12-01 DIAGNOSIS — H04.203 WATERY EYES: Chronic | ICD-10-CM

## 2020-12-01 DIAGNOSIS — I10 ESSENTIAL HYPERTENSION: Chronic | ICD-10-CM

## 2020-12-01 DIAGNOSIS — E53.8 B12 DEFICIENCY: ICD-10-CM

## 2020-12-01 DIAGNOSIS — E78.5 HYPERLIPIDEMIA, UNSPECIFIED HYPERLIPIDEMIA TYPE: ICD-10-CM

## 2020-12-01 DIAGNOSIS — E55.9 VITAMIN D DEFICIENCY: ICD-10-CM

## 2020-12-01 DIAGNOSIS — E03.9 HYPOTHYROIDISM, UNSPECIFIED TYPE: ICD-10-CM

## 2020-12-01 PROCEDURE — 99214 PR OFFICE/OUTPT VISIT, EST, LEVL IV, 30-39 MIN: ICD-10-PCS | Mod: 95,,, | Performed by: FAMILY MEDICINE

## 2020-12-01 PROCEDURE — 99214 OFFICE O/P EST MOD 30 MIN: CPT | Mod: 95,,, | Performed by: FAMILY MEDICINE

## 2020-12-01 RX ORDER — HYDROCHLOROTHIAZIDE 25 MG/1
25 TABLET ORAL DAILY
Qty: 90 TABLET | Refills: 3 | Status: SHIPPED | OUTPATIENT
Start: 2020-12-01

## 2020-12-01 RX ORDER — METHYLPREDNISOLONE 4 MG/1
TABLET ORAL
Qty: 21 TABLET | Refills: 0 | Status: SHIPPED | OUTPATIENT
Start: 2020-12-01

## 2020-12-01 RX ORDER — ESOMEPRAZOLE MAGNESIUM 40 MG/1
40 CAPSULE, DELAYED RELEASE ORAL
Qty: 90 CAPSULE | Refills: 3 | Status: SHIPPED | OUTPATIENT
Start: 2020-12-01

## 2020-12-01 RX ORDER — DEXTROAMPHETAMINE SACCHARATE, AMPHETAMINE ASPARTATE, DEXTROAMPHETAMINE SULFATE AND AMPHETAMINE SULFATE 5; 5; 5; 5 MG/1; MG/1; MG/1; MG/1
1 TABLET ORAL 2 TIMES DAILY
Qty: 60 TABLET | Refills: 0 | Status: SHIPPED | OUTPATIENT
Start: 2020-12-01

## 2020-12-01 RX ORDER — DEXTROAMPHETAMINE SACCHARATE, AMPHETAMINE ASPARTATE, DEXTROAMPHETAMINE SULFATE AND AMPHETAMINE SULFATE 5; 5; 5; 5 MG/1; MG/1; MG/1; MG/1
1 TABLET ORAL 2 TIMES DAILY
Qty: 60 TABLET | Refills: 0 | Status: SHIPPED | OUTPATIENT
Start: 2021-01-01

## 2020-12-01 RX ORDER — BIMATOPROST 3 UG/ML
SOLUTION TOPICAL
Qty: 2.5 ML | Refills: 12 | Status: SHIPPED | OUTPATIENT
Start: 2020-12-01

## 2020-12-01 RX ORDER — DEXTROAMPHETAMINE SACCHARATE, AMPHETAMINE ASPARTATE, DEXTROAMPHETAMINE SULFATE AND AMPHETAMINE SULFATE 5; 5; 5; 5 MG/1; MG/1; MG/1; MG/1
1 TABLET ORAL 2 TIMES DAILY
Qty: 60 TABLET | Refills: 0 | Status: SHIPPED | OUTPATIENT
Start: 2021-02-01

## 2020-12-01 NOTE — PROGRESS NOTES
Subjective:       Patient ID: Apolonia Soto is a 60 y.o. female.    Chief Complaint: No chief complaint on file.    59 yo F on a virtual visit for several issues.  ADHD: Pt needs ADHD medication to function well, finish work on time and to multi task.  Pt has not trouble sleeping and eating. pt denies diaphoresis, palpitations and diarrhea. pt understands the nature of the controlled substances. Pt won't share or otherwise make available the medication.     **' pt today**    Muscle tightness: pt takes steroids to help with her muscle tighness/sciatica. She is very responsible in the usage and she would like one medrol dose pack.  Watery eyes: pt had been prescribed Latisse in the past for watery eyes which helps her very well. I will refill today. Pt is compliant and has no AE.   Pt had a Hx of H pylori in addition to C Diff. She was put on a strict regimen and she is only still on Nexium for it. She has not had any AE and she has not had a recurrence of a new abdominal episode.   Pt is also due for her yearly labs. I will order them today.  Pt has no other complaints or worries today.     Review of Systems   Constitutional: Negative for activity change and unexpected weight change.   HENT: Negative for congestion, dental problem, hearing loss, rhinorrhea and trouble swallowing.    Eyes: Negative for discharge and visual disturbance.   Respiratory: Negative for chest tightness and wheezing.    Cardiovascular: Negative for chest pain and palpitations.   Gastrointestinal: Negative for blood in stool, constipation, diarrhea and vomiting.   Endocrine: Negative for polydipsia and polyuria.   Genitourinary: Negative for difficulty urinating, dysuria, hematuria and menstrual problem.   Musculoskeletal: Negative for arthralgias, joint swelling and neck pain.   Neurological: Negative for weakness and headaches.   Psychiatric/Behavioral: Negative for confusion and dysphoric mood.       Objective:      Physical  Exam  Constitutional:       Appearance: Normal appearance.   HENT:      Head: Normocephalic.      Nose: Nose normal.   Eyes:      Extraocular Movements: Extraocular movements intact.   Neck:      Musculoskeletal: Normal range of motion.   Pulmonary:      Effort: Pulmonary effort is normal. No respiratory distress.   Musculoskeletal: Normal range of motion.   Skin:     Coloration: Skin is not jaundiced or pale.   Neurological:      General: No focal deficit present.      Mental Status: She is alert. Mental status is at baseline.   Psychiatric:         Mood and Affect: Mood normal.       Limited PE 2ry to virtual visit  Assessment:       1. Attention deficit hyperactivity disorder (ADHD), combined type Well controlled   2. Essential hypertension    3. Hyperlipidemia, unspecified hyperlipidemia type    4. Vitamin D deficiency    5. Type 2 diabetes mellitus without complication, without long-term current use of insulin    6. Hypothyroidism, unspecified type    7. B12 deficiency    8. Watery eyes Well controlled   9. History of Helicobacter pylori infection Inactive   10. Muscle spasm Stable       Plan:       PROBLEM LIST     Diagnoses and all orders for this visit:    Attention deficit hyperactivity disorder (ADHD), combined type  -     dextroamphetamine-amphetamine (ADDERALL) 20 mg tablet; Take 1 tablet by mouth 2 (two) times daily.  -     dextroamphetamine-amphetamine (ADDERALL) 20 mg tablet; Take 1 tablet by mouth 2 (two) times daily.  -     dextroamphetamine-amphetamine (ADDERALL) 20 mg tablet; Take 1 tablet by mouth 2 (two) times daily.    Essential hypertension  Comments:  HCTZ 12.5 mg  Orders:  -     hydroCHLOROthiazide (HYDRODIURIL) 25 MG tablet; Take 1 tablet (25 mg total) by mouth once daily.  -     CBC Auto Differential; Future  -     Comprehensive Metabolic Panel; Future  -     CBC Auto Differential  -     Comprehensive Metabolic Panel    Hyperlipidemia, unspecified hyperlipidemia type  -     Lipid Panel;  Future  -     Lipid Panel    Vitamin D deficiency  -     Vitamin D; Future  -     Vitamin D    Type 2 diabetes mellitus without complication, without long-term current use of insulin  -     Hemoglobin A1C; Future  -     Hemoglobin A1C    Hypothyroidism, unspecified type  -     TSH; Future  -     TSH    B12 deficiency  -     Vitamin B12; Future  -     Vitamin B12    Watery eyes  Comments:  continue current regimen  Orders:  -     bimatoprost (LATISSE) 0.03 % ophthalmic solution; Use 1 drop on applicator, apply base of eyelashes qHS; repeat procedure for second eye (use a clean applicator).    History of Helicobacter pylori infection  Comments:  still on nexium  Orders:  -     esomeprazole (NEXIUM) 40 MG capsule; Take 1 capsule (40 mg total) by mouth before breakfast.    Muscle spasm  Comments:  medrol dose pack - for rare use  Orders:  -     methylPREDNISolone (MEDROL) 4 MG Tab; Use as directed:      The patient location is: home  The chief complaint leading to consultation is: labs and meds    Visit type: audiovisual    Face to Face time with patient: 25  25 minutes of total time spent on the encounter, which includes face to face time and non-face to face time preparing to see the patient (eg, review of tests), Obtaining and/or reviewing separately obtained history, Documenting clinical information in the electronic or other health record, Independently interpreting results (not separately reported) and communicating results to the patient/family/caregiver, or Care coordination (not separately reported).         Each patient to whom he or she provides medical services by telemedicine is:  (1) informed of the relationship between the physician and patient and the respective role of any other health care provider with respect to management of the patient; and (2) notified that he or she may decline to receive medical services by telemedicine and may withdraw from such care at any time.    Notes:

## 2021-05-12 ENCOUNTER — PATIENT MESSAGE (OUTPATIENT)
Dept: RESEARCH | Facility: HOSPITAL | Age: 61
End: 2021-05-12

## 2024-05-30 ENCOUNTER — OFFICE VISIT (OUTPATIENT)
Dept: OBSTETRICS AND GYNECOLOGY | Facility: CLINIC | Age: 64
End: 2024-05-30
Payer: OTHER GOVERNMENT

## 2024-05-30 VITALS
DIASTOLIC BLOOD PRESSURE: 84 MMHG | HEART RATE: 68 BPM | BODY MASS INDEX: 38.09 KG/M2 | HEIGHT: 66 IN | WEIGHT: 237 LBS | SYSTOLIC BLOOD PRESSURE: 139 MMHG

## 2024-05-30 DIAGNOSIS — Z12.31 SCREENING MAMMOGRAM FOR BREAST CANCER: ICD-10-CM

## 2024-05-30 DIAGNOSIS — Z80.3 FAMILY HISTORY OF BREAST CANCER: ICD-10-CM

## 2024-05-30 DIAGNOSIS — Z12.4 PAPANICOLAOU SMEAR FOR CERVICAL CANCER SCREENING: Primary | ICD-10-CM

## 2024-05-30 DIAGNOSIS — Z00.00 ENCOUNTER FOR WELLNESS EXAMINATION: ICD-10-CM

## 2024-05-30 DIAGNOSIS — Z13.820 SCREENING FOR OSTEOPOROSIS: ICD-10-CM

## 2024-05-30 PROCEDURE — 99386 PREV VISIT NEW AGE 40-64: CPT | Mod: S$GLB,,, | Performed by: OBSTETRICS & GYNECOLOGY

## 2024-05-30 RX ORDER — ONDANSETRON HYDROCHLORIDE 8 MG/1
TABLET, FILM COATED ORAL
COMMUNITY
Start: 2024-04-24

## 2024-05-30 RX ORDER — VALSARTAN AND HYDROCHLOROTHIAZIDE 160; 25 MG/1; MG/1
1 TABLET ORAL
COMMUNITY
Start: 2024-04-24

## 2024-05-30 NOTE — PROGRESS NOTES
Subjective     Patient ID: Apolonia Soto is a 63 y.o. female.    Chief Complaint:  Well Woman      History of Present Illness  Gynecologic Exam  The patient's pertinent negatives include no genital itching, genital lesions, genital odor, genital rash, missed menses, pelvic pain, vaginal bleeding or vaginal discharge. She is not pregnant. Pertinent negatives include no abdominal pain, anorexia, back pain, chills, constipation, diarrhea, discolored urine, dysuria, fever, flank pain, frequency, headaches, hematuria, nausea, painful intercourse, rash, urgency or vomiting. There has been no bleeding. She has not been passing clots. She has not been passing tissue. No, her partner does not have an STD. She is postmenopausal. There is no history of an abdominal surgery, a  section, an ectopic pregnancy, endometriosis, a gynecological surgery, herpes simplex, menorrhagia, metrorrhagia, miscarriage, ovarian cysts, perineal abscess, PID, an STD, a terminated pregnancy or vaginosis.     This is a 63 year old female coming in for her annual exam. She also has complaints of decreased libido.  She also complains of weight gain despite exercise.  She is concerned because her PCP did labs showing it  insulin resistance    GYN & OB History  No LMP recorded. Patient is postmenopausal.   Date of Last Pap: 2013    OB History    Para Term  AB Living   2 2 2     2   SAB IAB Ectopic Multiple Live Births           2      # Outcome Date GA Lbr Sohail/2nd Weight Sex Type Anes PTL Lv   2 Term     F Vag-Spont   PAULO   1 Term     F Vag-Spont   PAULO       Review of Systems  Review of Systems   Constitutional:  Positive for unexpected weight change. Negative for chills, fatigue and fever.   Respiratory:  Negative for cough and shortness of breath.    Cardiovascular:  Negative for chest pain, palpitations and leg swelling.   Gastrointestinal:  Negative for abdominal pain, anorexia, bloating, blood in stool,  constipation, diarrhea, nausea and vomiting.   Genitourinary:  Positive for decreased libido. Negative for dysmenorrhea, dyspareunia, dysuria, flank pain, frequency, genital sores, hematuria, menorrhagia, menstrual problem, missed menses, pelvic pain, urgency, vaginal discharge, urinary incontinence and vaginal odor.   Musculoskeletal:  Negative for arthralgias, back pain and joint swelling.   Integumentary:  Negative for rash, mole/lesion, breast mass, nipple discharge, breast skin changes and breast tenderness.   Neurological:  Negative for headaches.   Psychiatric/Behavioral: Negative.     Breast: Negative for asymmetry, lump, mass, nipple discharge, skin changes and tenderness         Objective   Physical Exam:   Constitutional: She appears well-developed and well-nourished.      Neck: No thyroid mass and no thyromegaly present.     Pulmonary/Chest: Chest wall is not dull to percussion. She exhibits no mass, no tenderness, no bony tenderness, no laceration, no crepitus, no edema, no deformity, no swelling and no retraction. Right breast exhibits no inverted nipple, no mass, no nipple discharge, no skin change, no tenderness, presence, no bleeding and no swelling. Left breast exhibits no inverted nipple, no mass, no nipple discharge, no skin change, no tenderness, presence, no bleeding and no swelling.        Abdominal: Soft. Bowel sounds are normal. There is no abdominal tenderness. Hernia confirmed negative in the right inguinal area and confirmed negative in the left inguinal area.     Genitourinary:    Vagina and uterus normal.      Pelvic exam was performed with patient supine.     Labial bartholins normal.There is no rash, tenderness, lesion or injury on the right labia. There is no rash, tenderness, lesion or injury on the left labia. Cervix is normal. Right adnexum displays no mass, no tenderness and no fullness. Left adnexum displays no mass, no tenderness and no fullness. No rectocele, cystocele or  prolapse of vaginal walls in the vagina. Vaginal atrophy noted.                Skin: Skin is warm and dry.    Psychiatric: She has a normal mood and affect. Her speech is normal and behavior is normal.            Assessment and Plan     1. Papanicolaou smear for cervical cancer screening    2. Screening mammogram for breast cancer    3. Screening for osteoporosis    4. Encounter for wellness examination    5. Family history of breast cancer            Plan:  Papanicolaou smear for cervical cancer screening  -     Liquid-based pap smear, screening    Screening mammogram for breast cancer  -     Mammo Digital Screening Bilat; Future; Expected date: 05/30/2024    Screening for osteoporosis  -     DXA Bone Density Axial Skeleton 1 or more sites; Future; Expected date: 05/30/2024    Encounter for wellness examination    Family history of breast cancer  -     BRCAnalysis w/ myRISK; Future; Expected date: 05/30/2024  -     MRI Breast w/wo Contrast, w/CAD, Bilateral; Future; Expected date: 05/30/2024       Discussed with the patience the importance of exercising at least three to four days a week, 30-45 minutes a session. Discussed options such as biking, walking, running, and swimming. Discussed increasing fruits and vegetables in diet and decreasing sodium and processed foods. Decrease carbonated beverages.   Medication options also discussed   This patient would likely benefit from GLP 1 agonist medications

## 2024-06-06 DIAGNOSIS — R87.619 ABNORMAL CERVICAL PAPANICOLAOU SMEAR, UNSPECIFIED ABNORMAL PAP FINDING: Primary | ICD-10-CM

## 2024-06-06 LAB — Lab: NORMAL

## 2024-06-06 RX ORDER — MISOPROSTOL 200 UG/1
200 TABLET ORAL ONCE
Qty: 2 TABLET | Refills: 0 | Status: SHIPPED | OUTPATIENT
Start: 2024-06-06 | End: 2024-06-06

## 2024-07-01 ENCOUNTER — PATIENT MESSAGE (OUTPATIENT)
Dept: OBSTETRICS AND GYNECOLOGY | Facility: CLINIC | Age: 64
End: 2024-07-01
Payer: OTHER GOVERNMENT

## 2024-07-12 ENCOUNTER — PROCEDURE VISIT (OUTPATIENT)
Dept: OBSTETRICS AND GYNECOLOGY | Facility: CLINIC | Age: 64
End: 2024-07-12
Payer: OTHER GOVERNMENT

## 2024-07-12 VITALS
BODY MASS INDEX: 35.84 KG/M2 | HEART RATE: 68 BPM | HEIGHT: 66 IN | DIASTOLIC BLOOD PRESSURE: 69 MMHG | SYSTOLIC BLOOD PRESSURE: 121 MMHG | WEIGHT: 223 LBS

## 2024-07-12 DIAGNOSIS — R87.619 ATYPICAL GLANDULAR CELLS OF UNDETERMINED SIGNIFICANCE (AGUS) ON CERVICAL PAP SMEAR: Primary | ICD-10-CM

## 2024-07-12 RX ORDER — SEMAGLUTIDE 0.68 MG/ML
INJECTION, SOLUTION SUBCUTANEOUS
COMMUNITY
Start: 2024-06-25

## 2024-07-12 RX ORDER — OMEPRAZOLE 40 MG/1
40 CAPSULE, DELAYED RELEASE ORAL
COMMUNITY
Start: 2024-06-17

## 2024-07-12 RX ORDER — SEMAGLUTIDE 1.34 MG/ML
INJECTION, SOLUTION SUBCUTANEOUS
COMMUNITY
Start: 2024-06-24

## 2024-07-12 NOTE — PROCEDURES
Biopsy (Gynecological)    Date/Time: 7/12/2024 9:40 AM    Performed by: Carolina Zarate MD  Authorized by: Carolina Zarate MD     Patient was prepped and draped in the normal sterile fashion.  Local anesthesia used?: No      Biopsy Location:  Uterus   Patient tolerated the procedure well with no immediate complications.

## 2024-07-12 NOTE — PROCEDURES
Colposcopy    Date/Time: 7/12/2024 9:40 AM    Performed by: Carolina Zarate MD  Authorized by: Carolina Zarate MD    Assistants?: Yes      Colposcopy Site:  Cervix and Vagina  Position:  Supine  Acrowhite Lesion? Yes    Atypical Vessels? Yes    Transformation Zone Adequate?: Yes    Biopsy?: No    ECC Performed?: Yes    LEEP Performed?: No     Patient tolerated the procedure well with no immediate complications.   Post-operative instructions were provided for the patient.   Patient was discharged and will follow up if any complications occur

## 2024-07-16 ENCOUNTER — TELEPHONE (OUTPATIENT)
Dept: OBSTETRICS AND GYNECOLOGY | Facility: CLINIC | Age: 64
End: 2024-07-16
Payer: OTHER GOVERNMENT

## 2024-07-16 DIAGNOSIS — R87.619 ATYPICAL GLANDULAR CELLS OF UNDETERMINED SIGNIFICANCE (AGUS) ON CERVICAL PAP SMEAR: Primary | ICD-10-CM

## 2024-07-16 NOTE — TELEPHONE ENCOUNTER
Spoke with the patient regarding her results which appear to be benign but they are all endocervical cells despite aggressive endometrial sampling.  The patient has atypical glandular cells and with a family history of endometrial cancer we feel it is important to get adequate endometrial sampling we will move forward with a fractional D&C and hysteroscopy

## 2024-08-01 ENCOUNTER — OFFICE VISIT (OUTPATIENT)
Dept: OBSTETRICS AND GYNECOLOGY | Facility: CLINIC | Age: 64
End: 2024-08-01
Payer: OTHER GOVERNMENT

## 2024-08-01 VITALS
HEIGHT: 66 IN | BODY MASS INDEX: 36.32 KG/M2 | HEART RATE: 88 BPM | WEIGHT: 226 LBS | SYSTOLIC BLOOD PRESSURE: 132 MMHG | DIASTOLIC BLOOD PRESSURE: 85 MMHG

## 2024-08-01 DIAGNOSIS — G89.18 POSTOPERATIVE PAIN: Primary | ICD-10-CM

## 2024-08-01 DIAGNOSIS — R87.619 ATYPICAL GLANDULAR CELLS OF UNDETERMINED SIGNIFICANCE (AGUS) ON CERVICAL PAP SMEAR: ICD-10-CM

## 2024-08-01 LAB
ANION GAP SERPL CALC-SCNC: 7 MMOL/L (ref 3–11)
APPEARANCE, UA: CLEAR
BASOPHILS NFR BLD: 0.5 % (ref 0–3)
BILIRUB UR QL STRIP: NEGATIVE MG/DL
BUN SERPL-MCNC: 13 MG/DL (ref 7–18)
BUN/CREAT SERPL: 10.07 RATIO (ref 7–18)
CALCIUM SERPL-MCNC: 9.2 MG/DL (ref 8.8–10.5)
CHLORIDE SERPL-SCNC: 100 MMOL/L (ref 100–108)
CO2 SERPL-SCNC: 31 MMOL/L (ref 21–32)
COLOR UR: NORMAL
CREAT SERPL-MCNC: 1.29 MG/DL (ref 0.55–1.02)
EOSINOPHIL NFR BLD: 1.3 % (ref 1–3)
ERYTHROCYTE [DISTWIDTH] IN BLOOD BY AUTOMATED COUNT: 13.2 % (ref 12.5–18)
GFR ESTIMATION: 42
GLUCOSE (UA): NORMAL MG/DL
GLUCOSE SERPL-MCNC: 89 MG/DL (ref 70–110)
HCT VFR BLD AUTO: 38.4 % (ref 37–47)
HGB BLD-MCNC: 12.7 G/DL (ref 12–16)
HGB UR QL STRIP: NEGATIVE /UL
KETONES UR QL STRIP: NEGATIVE MG/DL
LEUKOCYTE ESTERASE UR QL STRIP: NEGATIVE /UL
LYMPHOCYTES NFR BLD: 27.7 % (ref 25–40)
MCH RBC QN AUTO: 29.6 PG (ref 27–31.2)
MCHC RBC AUTO-ENTMCNC: 33.1 G/DL (ref 31.8–35.4)
MCV RBC AUTO: 89.5 FL (ref 80–97)
MONOCYTES NFR BLD: 5.1 % (ref 1–15)
NEUTROPHILS # BLD AUTO: 4.82 10*3/UL (ref 1.8–7.7)
NEUTROPHILS NFR BLD: 65 % (ref 37–80)
NITRITE UR QL STRIP: NEGATIVE
NUCLEATED RED BLOOD CELLS: 0 %
PH UR STRIP: 7 PH (ref 5–9)
PLATELETS: 301 10*3/UL (ref 142–424)
POTASSIUM SERPL-SCNC: 3.9 MMOL/L (ref 3.6–5.2)
PROT UR QL STRIP: NEGATIVE MG/DL
RBC # BLD AUTO: 4.29 10*6/UL (ref 4.2–5.4)
SODIUM BLD-SCNC: 138 MMOL/L (ref 135–145)
SP GR UR STRIP: 1.01 (ref 1–1.03)
SPECIMEN COLLECTION METHOD, URINE: NORMAL
UROBILINOGEN UR STRIP-ACNC: NORMAL MG/DL
WBC # BLD: 7.4 10*3/UL (ref 4.6–10.2)

## 2024-08-01 PROCEDURE — 99499 UNLISTED E&M SERVICE: CPT | Mod: S$GLB,,, | Performed by: OBSTETRICS & GYNECOLOGY

## 2024-08-01 RX ORDER — OXYCODONE AND ACETAMINOPHEN 5; 325 MG/1; MG/1
1 TABLET ORAL EVERY 4 HOURS PRN
Qty: 10 TABLET | Refills: 0 | Status: SHIPPED | OUTPATIENT
Start: 2024-08-01

## 2024-08-01 RX ORDER — MISOPROSTOL 200 UG/1
TABLET ORAL
Qty: 2 TABLET | Refills: 0 | Status: SHIPPED | OUTPATIENT
Start: 2024-08-01

## 2024-08-01 RX ORDER — IBUPROFEN 800 MG/1
800 TABLET ORAL 3 TIMES DAILY PRN
Qty: 30 TABLET | Refills: 0 | Status: SHIPPED | OUTPATIENT
Start: 2024-08-01

## 2024-08-01 NOTE — PROGRESS NOTES
History & Physical    SUBJECTIVE:     History of Present Illness:  Patient is a 63 y.o. female presents with  preop   HPI   Chief Complaint   Patient presents with    Pre-op Exam       Review of patient's allergies indicates:   Allergen Reactions    Penicillins Hives       Current Outpatient Medications   Medication Sig Dispense Refill    ibuprofen (ADVIL,MOTRIN) 800 MG tablet Take 1 tablet (800 mg total) by mouth 3 (three) times daily as needed for Pain. 30 tablet 0    miSOPROStoL (CYTOTEC) 200 MCG Tab 200mcg PO hs prior to  And am of the procedure 2 tablet 0    omeprazole (PRILOSEC) 40 MG capsule Take 40 mg by mouth. take with food      ondansetron (ZOFRAN) 8 MG tablet       oxyCODONE-acetaminophen (PERCOCET) 5-325 mg per tablet Take 1 tablet by mouth every 4 (four) hours as needed for Pain. 10 tablet 0    OZEMPIC 0.25 mg or 0.5 mg (2 mg/3 mL) pen injector INECT 0.25 MG SUBCUTANEOUSLY WEEKLY FOR 1 MONTH, THEN 0.5 MG WEEKLY THEREAFTER AS DIRECTED      OZEMPIC 1 mg/dose (4 mg/3 mL) INJECT 1 MG SUBCUTANEOUS ONCE A WEEK AS DIRECTED      valsartan-hydrochlorothiazide (DIOVAN-HCT) 160-25 mg per tablet Take 1 tablet by mouth.       No current facility-administered medications for this visit.       Past Medical History:   Diagnosis Date    ADHD (attention deficit hyperactivity disorder)     High blood pressure      No past surgical history on file.  Family History   Problem Relation Name Age of Onset    Heart disease Father      Diabetes Mother      Hypertension Mother      Arthritis Mother      Breast cancer Sister  35 - 39    Colon cancer Neg Hx      Ovarian cancer Neg Hx      Uterine cancer Neg Hx      Cervical cancer Neg Hx      Prostate cancer Neg Hx      Pancreatic cancer Neg Hx      Melanoma Neg Hx       Social History     Tobacco Use    Smoking status: Never    Smokeless tobacco: Never   Substance Use Topics    Alcohol use: Yes     Comment: socially    Drug use: Never        Review of Systems:  Review of Systems    Constitutional:  Negative for fatigue, fever and unexpected weight change.   Respiratory:  Negative for cough and shortness of breath.    Cardiovascular:  Negative for chest pain, palpitations and leg swelling.   Gastrointestinal:  Negative for abdominal pain, bloating, blood in stool, constipation, diarrhea, nausea and vomiting.   Genitourinary:  Negative for decreased libido, dysmenorrhea, dyspareunia, dysuria, frequency, genital sores, hematuria, menorrhagia, menstrual problem, pelvic pain, urgency, vaginal discharge, urinary incontinence and vaginal odor.   Musculoskeletal:  Negative for arthralgias and joint swelling.   Integumentary:  Negative for rash, mole/lesion, breast mass, nipple discharge, breast skin changes and breast tenderness.   Psychiatric/Behavioral: Negative.     Breast: Negative for asymmetry, lump, mass, nipple discharge, skin changes and tenderness       OBJECTIVE:     Vital Signs (Most Recent)              Physical Exam:  Physical Exam:   Constitutional: She appears well-developed and well-nourished.      Neck: No thyroid mass and no thyromegaly present.     Pulmonary/Chest: Chest wall is not dull to percussion. She exhibits no mass, no tenderness, no bony tenderness, no laceration, no crepitus, no edema, no deformity, no swelling and no retraction. Right breast exhibits no inverted nipple, no mass, no nipple discharge, no skin change, no tenderness, presence, no bleeding and no swelling. Left breast exhibits no inverted nipple, no mass, no nipple discharge, no skin change, no tenderness, presence, no bleeding and no swelling.        Abdominal: Soft. Bowel sounds are normal. There is no abdominal tenderness. Hernia confirmed negative in the right inguinal area and confirmed negative in the left inguinal area.     Genitourinary:    Vagina and uterus normal.      Pelvic exam was performed with patient supine.     Labial bartholins normal.There is no rash, tenderness, lesion or injury on the  right labia. There is no rash, tenderness, lesion or injury on the left labia. Cervix is normal. Right adnexum displays no mass, no tenderness and no fullness. Left adnexum displays no mass, no tenderness and no fullness. No rectocele, cystocele or prolapse of vaginal walls in the vagina.                Skin: Skin is warm and dry.    Psychiatric: She has a normal mood and affect. Her speech is normal and behavior is normal.      ASSESSMENT/PLAN:     1. Postoperative pain  - oxyCODONE-acetaminophen (PERCOCET) 5-325 mg per tablet; Take 1 tablet by mouth every 4 (four) hours as needed for Pain.  Dispense: 10 tablet; Refill: 0  - ibuprofen (ADVIL,MOTRIN) 800 MG tablet; Take 1 tablet (800 mg total) by mouth 3 (three) times daily as needed for Pain.  Dispense: 30 tablet; Refill: 0    2. Atypical glandular cells of undetermined significance (SHAHEEN) on cervical Pap smear  - miSOPROStoL (CYTOTEC) 200 MCG Tab; 200mcg PO hs prior to  And am of the procedure  Dispense: 2 tablet; Refill: 0       PLAN:Plan     Discussed with patient the risks of surgery, including but not limited to, risks of anesthesia, infection, bleeding, injury to other organs, such as skin, nerves, arteries, veins, bowel, bladder, ureters, with possible need for reparative or subsequent surgery such as bowel resection/repair, hernia, colostomy, bladder/ureter surgery, blood transfusion, possible hysterectomy  . Discussed with the Patient the risks/benefits/alternatives of the treatment options.  Consents were signed  Postoperative pain  -     oxyCODONE-acetaminophen (PERCOCET) 5-325 mg per tablet; Take 1 tablet by mouth every 4 (four) hours as needed for Pain.  Dispense: 10 tablet; Refill: 0  -     ibuprofen (ADVIL,MOTRIN) 800 MG tablet; Take 1 tablet (800 mg total) by mouth 3 (three) times daily as needed for Pain.  Dispense: 30 tablet; Refill: 0    Atypical glandular cells of undetermined significance (SAHHEEN) on cervical Pap smear  -     miSOPROStoL  (CYTOTEC) 200 MCG Tab; 200mcg PO hs prior to  And am of the procedure  Dispense: 2 tablet; Refill: 0      Will do a  Salpingectomy, hysteroscopy, dilation and curettage  because despite aggressive endometrial sampling with endometrial biopsy there was only endocervical cells present and the patient has a history in her family of endometrial cancer

## 2024-08-06 ENCOUNTER — OUTSIDE PLACE OF SERVICE (OUTPATIENT)
Dept: OBSTETRICS AND GYNECOLOGY | Facility: CLINIC | Age: 64
End: 2024-08-06

## 2024-08-07 ENCOUNTER — TELEPHONE (OUTPATIENT)
Dept: OBSTETRICS AND GYNECOLOGY | Facility: CLINIC | Age: 64
End: 2024-08-07
Payer: OTHER GOVERNMENT

## 2024-08-08 ENCOUNTER — PATIENT MESSAGE (OUTPATIENT)
Dept: OBSTETRICS AND GYNECOLOGY | Facility: CLINIC | Age: 64
End: 2024-08-08
Payer: OTHER GOVERNMENT

## 2025-07-24 ENCOUNTER — OFFICE VISIT (OUTPATIENT)
Dept: OBSTETRICS AND GYNECOLOGY | Facility: CLINIC | Age: 65
End: 2025-07-24
Payer: OTHER GOVERNMENT

## 2025-07-24 VITALS
HEART RATE: 64 BPM | WEIGHT: 200.81 LBS | HEIGHT: 66 IN | BODY MASS INDEX: 32.27 KG/M2 | SYSTOLIC BLOOD PRESSURE: 133 MMHG | DIASTOLIC BLOOD PRESSURE: 78 MMHG

## 2025-07-24 DIAGNOSIS — Z12.31 SCREENING MAMMOGRAM FOR BREAST CANCER: ICD-10-CM

## 2025-07-24 DIAGNOSIS — Z91.89 AT HIGH RISK FOR BREAST CANCER: ICD-10-CM

## 2025-07-24 DIAGNOSIS — Z80.3 FAMILY HISTORY OF BREAST CANCER: ICD-10-CM

## 2025-07-24 DIAGNOSIS — Z00.00 ENCOUNTER FOR WELLNESS EXAMINATION: ICD-10-CM

## 2025-07-24 DIAGNOSIS — R87.619 ATYPICAL GLANDULAR CELLS ON CERVICAL PAP SMEAR: Primary | ICD-10-CM

## 2025-07-24 RX ORDER — ESOMEPRAZOLE MAGNESIUM 40 MG/1
40 CAPSULE, DELAYED RELEASE ORAL
COMMUNITY

## 2025-07-24 RX ORDER — TRAMADOL HYDROCHLORIDE 50 MG/1
50 TABLET, FILM COATED ORAL
COMMUNITY
End: 2025-07-24

## 2025-07-24 RX ORDER — OMEPRAZOLE 40 MG/1
40 CAPSULE, DELAYED RELEASE ORAL
COMMUNITY
Start: 2025-07-02

## 2025-07-24 RX ORDER — SEMAGLUTIDE 2.68 MG/ML
INJECTION, SOLUTION SUBCUTANEOUS
COMMUNITY
Start: 2024-09-24 | End: 2025-07-24

## 2025-07-24 RX ORDER — ACETAMINOPHEN 500 MG
500 TABLET ORAL
COMMUNITY
End: 2025-07-24

## 2025-07-24 RX ORDER — VALSARTAN AND HYDROCHLOROTHIAZIDE 160; 25 MG/1; MG/1
1 TABLET ORAL
COMMUNITY
Start: 2025-05-01

## 2025-07-24 RX ORDER — CLARITHROMYCIN 250 MG/1
250 TABLET, FILM COATED ORAL
COMMUNITY
End: 2025-07-24

## 2025-07-24 NOTE — PROGRESS NOTES
Subjective     Patient ID: Apolonia Soto is a 64 y.o. female.    Chief Complaint:  Well Woman (Pt has no c/o )      History of Present Illness  HPI  This is a 64 year old female here for her annual exam and has no additional complaints      GYN & OB History  No LMP recorded. Patient is postmenopausal.   Date of Last Pap: 2024    OB History    Para Term  AB Living   2 2 2   2   SAB IAB Ectopic Multiple Live Births       2      # Outcome Date GA Lbr Sohail/2nd Weight Sex Type Anes PTL Lv   2 Term     F Vag-Spont   PAULO   1 Term     F Vag-Spont   PAULO       Review of Systems  Review of Systems   Constitutional:  Negative for fatigue, fever and unexpected weight change.   Respiratory:  Negative for cough and shortness of breath.    Cardiovascular:  Negative for chest pain, palpitations and leg swelling.   Gastrointestinal:  Negative for abdominal pain, bloating, blood in stool, constipation, diarrhea, nausea and vomiting.   Genitourinary:  Negative for decreased libido, dysmenorrhea, dyspareunia, dysuria, frequency, genital sores, hematuria, menorrhagia, menstrual problem, pelvic pain, urgency, vaginal discharge, urinary incontinence and vaginal odor.   Musculoskeletal:  Negative for arthralgias and joint swelling.   Integumentary:  Negative for rash, mole/lesion, breast mass, nipple discharge, breast skin changes and breast tenderness.   Psychiatric/Behavioral: Negative.     Breast: Negative for asymmetry, lump, mass, nipple discharge, skin changes and tenderness         Objective   Physical Exam:   Constitutional: She appears well-developed and well-nourished.      Neck: No thyroid mass and no thyromegaly present.     Pulmonary/Chest: Chest wall is not dull to percussion. She exhibits no mass, no tenderness, no bony tenderness, no laceration, no crepitus, no edema, no deformity, no swelling and no retraction. Right breast exhibits no inverted nipple, no mass, no nipple discharge, no skin  change, no tenderness, presence, no bleeding and no swelling. Left breast exhibits no inverted nipple, no mass, no nipple discharge, no skin change, no tenderness, presence, no bleeding and no swelling.        Abdominal: Soft. Bowel sounds are normal. There is no abdominal tenderness. Hernia confirmed negative in the right inguinal area and confirmed negative in the left inguinal area.     Genitourinary:    Vagina and uterus normal.      Pelvic exam was performed with patient supine.     Labial bartholins normal.There is no rash, tenderness, lesion or injury on the right labia. There is no rash, tenderness, lesion or injury on the left labia. Cervix is normal. Right adnexum displays no mass, no tenderness and no fullness. Left adnexum displays no mass, no tenderness and no fullness. No rectocele, cystocele or prolapse of vaginal walls in the vagina. Vaginal atrophy noted.                Skin: Skin is warm and dry.    Psychiatric: She has a normal mood and affect. Her speech is normal and behavior is normal.    Chaperone present           Assessment and Plan     1. Atypical glandular cells on cervical Pap smear    2. Family history of breast cancer    3. At high risk for breast cancer    4. Screening mammogram for breast cancer    5. Encounter for wellness examination            Plan:  Atypical glandular cells on cervical Pap smear  -     Liquid-based pap smear, screening    Family history of breast cancer  -     MRI Breast w/wo Contrast, w/CAD, Bilateral; Future; Expected date: 07/24/2025    At high risk for breast cancer  -     MRI Breast w/wo Contrast, w/CAD, Bilateral; Future; Expected date: 07/24/2025    Screening mammogram for breast cancer  -     Mammo Digital Screening Bilat; Future; Expected date: 07/24/2025    Encounter for wellness examination

## 2025-07-31 ENCOUNTER — PATIENT MESSAGE (OUTPATIENT)
Dept: OBSTETRICS AND GYNECOLOGY | Facility: CLINIC | Age: 65
End: 2025-07-31
Payer: OTHER GOVERNMENT